# Patient Record
Sex: MALE | Employment: UNEMPLOYED | URBAN - METROPOLITAN AREA
[De-identification: names, ages, dates, MRNs, and addresses within clinical notes are randomized per-mention and may not be internally consistent; named-entity substitution may affect disease eponyms.]

---

## 2020-12-29 ENCOUNTER — OFFICE VISIT (OUTPATIENT)
Dept: FAMILY MEDICINE | Facility: CLINIC | Age: 60
End: 2020-12-29

## 2020-12-29 VITALS
DIASTOLIC BLOOD PRESSURE: 86 MMHG | BODY MASS INDEX: 40.11 KG/M2 | HEIGHT: 69 IN | SYSTOLIC BLOOD PRESSURE: 130 MMHG | OXYGEN SATURATION: 94 % | HEART RATE: 68 BPM | WEIGHT: 270.8 LBS | TEMPERATURE: 98.1 F

## 2020-12-29 DIAGNOSIS — Z76.89 HEALTH CARE HOME: ICD-10-CM

## 2020-12-29 DIAGNOSIS — Z71.89 ACP (ADVANCE CARE PLANNING): ICD-10-CM

## 2020-12-29 DIAGNOSIS — Z00.00 ROUTINE HISTORY AND PHYSICAL EXAMINATION OF ADULT: Primary | ICD-10-CM

## 2020-12-29 DIAGNOSIS — I10 ESSENTIAL HYPERTENSION: ICD-10-CM

## 2020-12-29 PROBLEM — E66.01 MORBID OBESITY (H): Status: ACTIVE | Noted: 2020-12-29

## 2020-12-29 LAB
ALBUMIN SERPL-MCNC: 4.3 G/DL (ref 3.6–5.1)
ALBUMIN/GLOB SERPL: 1.7 {RATIO} (ref 1–2.5)
ALP SERPL-CCNC: 43 U/L (ref 33–130)
ALT 1742-6: 49 U/L (ref 0–32)
AST 1920-8: 30 U/L (ref 0–35)
BILIRUB SERPL-MCNC: 0.9 MG/DL (ref 0.2–1.2)
BUN SERPL-MCNC: 19 MG/DL (ref 7–25)
BUN/CREATININE RATIO: 22.4 (ref 6–22)
CALCIUM SERPL-MCNC: 9.6 MG/DL (ref 8.6–10.3)
CHLORIDE SERPLBLD-SCNC: 101 MMOL/L (ref 98–110)
CHOLEST SERPL-MCNC: 209 MG/DL (ref 0–199)
CHOLEST/HDLC SERPL: 4 {RATIO} (ref 0–5)
CO2 SERPL-SCNC: 33.8 MMOL/L (ref 20–32)
CREAT SERPL-MCNC: 0.85 MG/DL (ref 0.7–1.18)
GLOBULIN, CALCULATED - QUEST: 2.5 (ref 1.9–3.7)
GLUCOSE SERPL-MCNC: 112 MG/DL (ref 60–99)
HDLC SERPL-MCNC: 52 MG/DL (ref 40–150)
HEMOGLOBIN: 16.7 G/DL (ref 13.3–17.7)
LDLC SERPL CALC-MCNC: 129 MG/DL (ref 0–130)
POTASSIUM SERPL-SCNC: 4.48 MMOL/L (ref 3.5–5.3)
PROT SERPL-MCNC: 6.8 G/DL (ref 6.1–8.1)
SODIUM SERPL-SCNC: 140.2 MMOL/L (ref 135–146)
TRIGL SERPL-MCNC: 142 MG/DL (ref 0–149)

## 2020-12-29 PROCEDURE — 36415 COLL VENOUS BLD VENIPUNCTURE: CPT | Performed by: FAMILY MEDICINE

## 2020-12-29 PROCEDURE — 85018 HEMOGLOBIN: CPT | Performed by: FAMILY MEDICINE

## 2020-12-29 PROCEDURE — 84153 ASSAY OF PSA TOTAL: CPT | Mod: 90 | Performed by: FAMILY MEDICINE

## 2020-12-29 PROCEDURE — 99396 PREV VISIT EST AGE 40-64: CPT | Performed by: FAMILY MEDICINE

## 2020-12-29 PROCEDURE — 80053 COMPREHEN METABOLIC PANEL: CPT | Performed by: FAMILY MEDICINE

## 2020-12-29 PROCEDURE — 80061 LIPID PANEL: CPT | Performed by: FAMILY MEDICINE

## 2020-12-29 RX ORDER — LISINOPRIL AND HYDROCHLOROTHIAZIDE 20; 25 MG/1; MG/1
TABLET ORAL
Qty: 90 TABLET | Refills: 1 | Status: SHIPPED | OUTPATIENT
Start: 2020-12-29 | End: 2020-12-29

## 2020-12-29 RX ORDER — LISINOPRIL AND HYDROCHLOROTHIAZIDE 20; 25 MG/1; MG/1
TABLET ORAL
COMMUNITY
Start: 2020-09-14 | End: 2020-12-29

## 2020-12-29 RX ORDER — LISINOPRIL AND HYDROCHLOROTHIAZIDE 20; 25 MG/1; MG/1
TABLET ORAL
Qty: 90 TABLET | Refills: 3 | Status: SHIPPED | OUTPATIENT
Start: 2020-12-29 | End: 2022-01-04

## 2020-12-29 ASSESSMENT — MIFFLIN-ST. JEOR: SCORE: 2028.72

## 2020-12-29 NOTE — PROGRESS NOTES
SUBJECTIVE:    CC: Mayur Celeste is a 60 year old male who presents for physical    HPI: over all doing well  Walks daily      HISTORIES:    PROBLEM LIST:                   Patient Active Problem List   Diagnosis     Status post THR (total hip replacement)     Osteoarthritis     ACP (advance care planning)     Health Care Home     Essential hypertension, benign     Family history of prostate cancer     Morbid obesity (H)       PAST MEDICAL HISTORY:                  Past Medical History:   Diagnosis Date     Chronic pain     back pain     Hypertension      PONV (postoperative nausea and vomiting)        PAST SURGICAL HISTORY:                  Past Surgical History:   Procedure Laterality Date     ARTHROPLASTY HIP      left     ARTHROPLASTY HIP  12/21/2011    Procedure:ARTHROPLASTY HIP; Right Total Hip Arthroplasty, Right Hip Hardware Removal     ; Surgeon:ISAAC RODRIGUEZ; Location:[facility]     ORTHOPEDIC SURGERY      right hip pinning       CURRENT MEDICATIONS:                  Current Outpatient Medications   Medication Sig Dispense Refill     lisinopril-hydrochlorothiazide (ZESTORETIC) 20-25 MG tablet TK 1 T PO D 90 tablet 1     naproxen sodium (ANAPROX) 220 MG tablet Take 220 mg by mouth 2 times daily (with meals).         ALLERGIES:                No Known Allergies    SOCIAL HISTORY:                  Social History     Socioeconomic History     Marital status:      Spouse name: Not on file     Number of children: Not on file     Years of education: Not on file     Highest education level: Not on file   Occupational History     Not on file   Social Needs     Financial resource strain: Not on file     Food insecurity     Worry: Not on file     Inability: Not on file     Transportation needs     Medical: Not on file     Non-medical: Not on file   Tobacco Use     Smoking status: Never Smoker     Smokeless tobacco: Never Used   Substance and Sexual Activity     Alcohol use: Yes     Comment: occ     Drug use:  "No     Sexual activity: Not on file   Lifestyle     Physical activity     Days per week: Not on file     Minutes per session: Not on file     Stress: Not on file   Relationships     Social connections     Talks on phone: Not on file     Gets together: Not on file     Attends Zoroastrianism service: Not on file     Active member of club or organization: Not on file     Attends meetings of clubs or organizations: Not on file     Relationship status: Not on file     Intimate partner violence     Fear of current or ex partner: Not on file     Emotionally abused: Not on file     Physically abused: Not on file     Forced sexual activity: Not on file   Other Topics Concern     Not on file   Social History Narrative     Not on file       FAMILY HISTORY:                 History reviewed. No pertinent family history.    HEALTH MAINTENANCE:                    REVIEW OF OUTSIDE RECORDS: NO    REVIEW OF SYSTEMS:  CONSTITUTIONAL: NEGATIVE for fever, chills  INTEGUMENTARY/SKIN: NEGATIVE for worrisome rashes, moles or lesions  EYES: NEGATIVE for vision changes   ENT/MOUTH: NEGATIVE for ear, mouth and throat problems  RESP: NEGATIVE for significant cough or SOB  CV: NEGATIVE for chest pain, palpitations   GI: NEGATIVE for nausea, abdominal pain, heartburn, or change in bowel habits  : NEGATIVE for frequency, dysuria, or hematuria  MUSCULOSKELETAL: NEGATIVE for significant arthralgias or myalgia  NEURO: NEGATIVE for weakness, dizziness or paresthesias or headache  ENDOCRINE: NEGATIVE for temperature intolerance, skin/hair changes  HEME: NEGATIVE for bleeding problems  PSYCHIATRIC: NEGATIVE for changes in mood or affect    EXAM:    /86 (BP Location: Right arm, Patient Position: Sitting, Cuff Size: Adult Large)   Pulse 68   Temp 98.1  F (36.7  C) (Oral)   Ht 1.753 m (5' 9\")   Wt 122.8 kg (270 lb 12.8 oz)   SpO2 94%   BMI 39.99 kg/m    GENERAL APPEARANCE: healthy, alert and no distress   EXAM:  GENERAL APPEARANCE: healthy, " alert and no distress  EYES: EOMI,  PERRL  HENT: ear canals and TM's normal and nose and mouth without ulcers or lesions  NECK: no adenopathy, no asymmetry, masses, or scars and thyroid normal to palpation  RESP: lungs clear to auscultation - no rales, rhonchi or wheezes  CV: regular rates and rhythm, normal S1 S2, no S3 or S4 and no murmur, click or rub -  ABDOMEN:  soft, nontender, no HSM or masses and bowel sounds normal  Rectal exam: prostate symmetric w/o nodularity, no masses palpated  GU_male: testicles normal without atrophy or masses and no hernias  MS: extremities normal- no gross deformities noted, no evidence of inflammation in joints, FROM in all extremities.  SKIN: no suspicious lesions or rashes  NEURO: Normal strength and tone, sensory exam grossly normal, mentation intact and speech normal  PSYCH: mentation appears normal and affect normal/bright  LYMPHATICS: No axillary, cervical, inguinal, or supraclavicular nodes         ASSESSMENT/PLAN  (Z00.00) Routine history and physical examination of adult  (primary encounter diagnosis)  Comment: over all doing well  Plan: Comprehensive Metobolic Panel (BFP),         HEMOGLOBIN, Lipid Panel (BFP), PSA Total         ([facility]), VENOUS COLLECTION            (Z71.89) ACP (advance care planning)  Comment:   Plan:     (Z76.89) Health Care Home  Comment:   Plan:     (I10) Essential hypertension  Comment: well controlled  Plan: lisinopril-hydrochlorothiazide (ZESTORETIC)         20-25 MG tablet, Comprehensive Metobolic Panel         (BFP)                I have discussed with patient the risks, benefits, medications, treatment options and modalities.   I have instructed the patient to call or schedule a follow-up appointment if any problems or failure to improve.

## 2020-12-29 NOTE — LETTER
December 30, 2020      Mayur Celeste  [address] [facility] [address]        Dear ,    We are writing to inform you of your test results.    Mayur here are your results like we discussed     Resulted Orders   Comprehensive Metobolic Panel (BFP)   Result Value Ref Range    Carbon Dioxide 33.8 (A) 20 - 32 mmol/L      Comment:      ran twice ea    Creatinine 0.85 0.70 - 1.18 mg/dL    Glucose 112 (A) 60 - 99 mg/dL    Sodium 140.2 135 - 146 mmol/L    Potassium 4.48 3.5 - 5.3 mmol/L    Chloride 101.0 98 - 110 mmol/L    Protein Total 6.8 6.1 - 8.1 g/dL    Albumin 4.3 3.6 - 5.1 g/dL    Alkaline Phosphatase 43 33 - 130 U/L    ALT 49 (A) 0 - 32 U/L    AST 30 0 - 35 U/L    Bilirubin Total 0.9 0.2 - 1.2 mg/dL    Urea Nitrogen 19 7 - 25 mg/dL    Calcium 9.6 8.6 - 10.3 mg/dL    BUN/Creatinine Ratio 22.4 (A) 6 - 22    Globulin Calculated 2.5 1.9 - 3.7    A/G Ratio 1.7 1 - 2.5   Lipid Panel (BFP)   Result Value Ref Range    Cholesterol 209 (A) 0 - 199 mg/dL    Triglycerides 142 0 - 149 mg/dL    HDL Cholesterol 52 40 - 150 mg/dL    LDL Cholesterol Direct 129 0 - 130 mg/dL    Cholesterol/HDL Ratio 4 0 - 5   PSA Total ([facility])   Result Value Ref Range    Abbott PSA 1.9 < OR = 4.0 ng/mL      Comment:      The total PSA value from this assay system is   standardized against the WHO standard. The test   result will be approximately 20% lower when compared   to the equimolar-standardized total PSA (Essie   Mal[place]). Comparison of serial PSA results should be   interpreted with this fact in mind.     This test was performed using the Siemens   chemiluminescent method. Values obtained from   different assay methods cannot be used  interchangeably. PSA levels, regardless of  value, should not be interpreted as absolute  evidence of the presence or absence of disease.     HEMOGLOBIN (BFP)   Result Value Ref Range    Hemoglobin 16.7 13.3 - 17.7 g/dL       If you have any questions or concerns, please call the clinic at the  number listed above.       Sincerely,      Med Fitzpatrick MD

## 2020-12-29 NOTE — LETTER
[facility]  [address] [place]  [address]  [facility] [address]  [phone]      December 29, 2020      Mayur Celeste  [address] [facility] [address]      EMERGENCY CARE PLAN  Presenting Problem Treatment Plan   Questions or concerns during clinic hours I will call the clinic directly:    [facility]  [address], [address]  [place] [address]  [phone]   Questions or concerns outside clinic hours  I will call the 24 hour line at [phone]   Patient needs to schedule an appointment  I will call the  scheduling line at [phone]   Same day treatment   I will call the clinic first, then  urgent care and/or  express care if needed   Clinic Care Coordinators Ina Garnett RN:  [phone]  [facility] Clinical Support Staff: [phone]    Crisis Services:  Behavioral or Mental Health P ([facility])   [phone]   Emergency treatment--Immediately CALL 231

## 2020-12-29 NOTE — NURSING NOTE
Mayur is here to establish care and fasting CPX.    Pre-Visit Screening:  Immunizations:UTD  Colonoscopy:Needs  Mammogram:NA  Asthma Action Test/Plan:NA  PHQ9:NA  GAD7:NA  Questioned patient about current smoking habits Pt.never smoked  OK to leave a detailed message on voice mail for today's visit yes, phone # [phone]

## 2020-12-30 LAB — ABBOTT PSA - QUEST: 1.9 NG/ML

## 2021-07-01 ENCOUNTER — TELEPHONE (OUTPATIENT)
Dept: FAMILY MEDICINE | Facility: CLINIC | Age: 61
End: 2021-07-01

## 2021-07-01 NOTE — TELEPHONE ENCOUNTER
Ok'd the remainder of Mayur's lisinopril-hydrochlorothiazide (qty 90, 1 refill) to be under Dr. Dumont's DILIA. As Dr. Fitzpatrick's DILIA is . Pt will be due back for a routine cpx in Dec 2021.

## 2021-08-18 ENCOUNTER — OFFICE VISIT (OUTPATIENT)
Dept: FAMILY MEDICINE | Facility: CLINIC | Age: 61
End: 2021-08-18

## 2021-08-18 DIAGNOSIS — H25.9 AGE-RELATED CATARACT OF BOTH EYES, UNSPECIFIED AGE-RELATED CATARACT TYPE: ICD-10-CM

## 2021-08-18 DIAGNOSIS — Z12.11 SCREENING FOR COLON CANCER: ICD-10-CM

## 2021-08-18 DIAGNOSIS — I10 ESSENTIAL HYPERTENSION: ICD-10-CM

## 2021-08-18 DIAGNOSIS — E66.01 CLASS 3 SEVERE OBESITY DUE TO EXCESS CALORIES WITH SERIOUS COMORBIDITY AND BODY MASS INDEX (BMI) OF 40.0 TO 44.9 IN ADULT (H): ICD-10-CM

## 2021-08-18 DIAGNOSIS — E66.813 CLASS 3 SEVERE OBESITY DUE TO EXCESS CALORIES WITH SERIOUS COMORBIDITY AND BODY MASS INDEX (BMI) OF 40.0 TO 44.9 IN ADULT (H): ICD-10-CM

## 2021-08-18 DIAGNOSIS — Z01.818 PREOPERATIVE EXAMINATION: Primary | ICD-10-CM

## 2021-08-18 PROCEDURE — 99214 OFFICE O/P EST MOD 30 MIN: CPT | Performed by: FAMILY MEDICINE

## 2021-08-18 ASSESSMENT — MIFFLIN-ST. JEOR: SCORE: 2064.99

## 2021-08-18 NOTE — LETTER
[facility]  [address]  [address]  [facility] [address]  Phone: [phone]  Fax: [phone]  Primary Provider: Rich Dumont        PREOPERATIVE EVALUATION:  Today's date: 8/18/2021    Mayur Celeste is a 61 year old male who presents for a preoperative evaluation.    Surgical Information:  Surgery/Procedure: cataract  Surgery Location: [facility]  Surgeon: Dr Lozoya  Surgery Date: 8/26/21, 9/13/21  Time of Surgery: am  Where patient plans to recover: At home with family  Fax number for surgical facility:     Type of Anesthesia Anticipated: to be determined    Assessment & Plan     The proposed surgical procedure is considered LOW risk.    Preoperative examination      Age-related cataract of both eyes, unspecified age-related cataract type      Essential hypertension  Elevated slightly today, has gained weight, Check blood pressure readings outside of the clinic several times per week, write down values, and follow up if elevated within the next several weeks. Blood pressure can be checked at the firestation, drugstore,  or any valid site.     Screening for colon cancer    - Adult Gastro Ref - Procedure Only    Class 3 severe obesity due to excess calories with serious comorbidity and body mass index (BMI) of 40.0 to 44.9 in adult (H)  Needs work, Continue to work on healthy diet and exercise, discussed healthy habits           Risks and Recommendations:  The patient has the following additional risks and recommendations for perioperative complications:   - Morbid obesity (BMI >40)    Medication Instructions:  Patient is to take all scheduled medications on the day of surgery    RECOMMENDATION:  APPROVAL GIVEN to proceed with proposed procedure, without further diagnostic evaluation.    Review of external notes as documented above   Review of the result(s) of each unique test - previous labs                  Subjective     1. No - Have you ever had a heart  attack or stroke?  2. No - Have you ever had surgery on your heart or blood vessels, such as a stent, coronary (heart) bypass, or surgery on an artery in the head, neck, heart, or legs?  3. No - Do you have chest pain when you are physically active?  4. No - Do you have a history of heart failure?  5. No - Do you currently have a cold, bronchitis, or symptoms of other respiratory (head and chest) infections?  6. No - Do you have a cough, shortness of breath, or wheezing?  7. No - Do you or anyone in your family have a history of blood clots?  8. No - Do you or anyone in your family have a serious bleeding problem, such as long-lasting bleeding after surgeries or cuts?  9. No - Have you ever had anemia or been told to take iron pills?  10. No - Have you had any abnormal blood loss such as black, tarry or bloody stools, or abnormal vaginal bleeding?  11. No - Have you ever had a blood transfusion?  12. Yes - Are you willing to have a blood transfusion if it is medically needed before, during, or after your surgery?  13. No - Have you or anyone in your family ever had problems with anesthesia (sedation for surgery)?  14. No - Do you have sleep apnea, excessive snoring, or daytime drowsiness?   15. No - Do you have any artifical heart valves or other implanted medical devices, such as a pacemaker, defibrillator, or continuous glucose monitor?  16. No - Do you have any artifical joints?  17. No - Are you allergic to latex?  18. No - Is there any chance that you may be pregnant?      Health Care Directive:  Patient does not have a Health Care Directive or Living Will: Discussed advance care planning with patient; information given to patient to review.      Status of Chronic Conditions:  HYPERTENSION - Patient has longstanding history of HTN , currently denies any symptoms referable to elevated blood pressure. Specifically denies chest pain, palpitations, dyspnea, orthopnea, PND or peripheral edema. Blood pressure  "readings have generally been in normal range. Current medication regimen is as listed below. Patient denies any side effects of medication.       Review of Systems  CONSTITUTIONAL: NEGATIVE for fever, chills, change in weight  ENT/MOUTH: NEGATIVE for ear, mouth and throat problems  RESP: NEGATIVE for significant cough or SOB  CV: NEGATIVE for chest pain, palpitations or peripheral edema    Patient Active Problem List    Diagnosis Date Noted     ACP (advance care planning) 12/29/2020     Priority: Medium     Health Care Home 12/29/2020     Priority: Medium     Morbid obesity (H) 12/29/2020     Priority: Medium     Status post THR (total hip replacement) 12/21/2011     Priority: Medium     Osteoarthritis 12/21/2011     Priority: Medium     Essential hypertension, benign 12/07/2010     Priority: Medium     Family history of prostate cancer 12/07/2010     Priority: Medium      Past Medical History:   Diagnosis Date     Chronic pain     back pain     Hypertension      PONV (postoperative nausea and vomiting)      Past Surgical History:   Procedure Laterality Date     ARTHROPLASTY HIP      left     ARTHROPLASTY HIP  12/21/2011    Procedure:ARTHROPLASTY HIP; Right Total Hip Arthroplasty, Right Hip Hardware Removal     ; Surgeon:ISAAC RODRIGUEZ; Location:[facility]     ORTHOPEDIC SURGERY      right hip pinning     Current Outpatient Medications   Medication Sig Dispense Refill     lisinopril-hydrochlorothiazide (ZESTORETIC) 20-25 MG tablet TK 1 T PO D 90 tablet 3     naproxen sodium (ANAPROX) 220 MG tablet Take 220 mg by mouth 2 times daily (with meals).         No Known Allergies     Social History     Tobacco Use     Smoking status: Never Smoker     Smokeless tobacco: Never Used   Substance Use Topics     Alcohol use: Yes     Comment: occ     No family history on file.  History   Drug Use No         Objective     Resp 20   Ht 1.753 m (5' 9\")   Wt 125.4 kg (276 lb 6.4 oz)   BMI 40.82 kg/m      Physical Exam  GENERAL " APPEARANCE: healthy, alert and no distress  HENT: ear canals and TM's normal and nose and mouth without ulcers or lesions  RESP: lungs clear to auscultation - no rales, rhonchi or wheezes  CV: regular rate and rhythm, normal S1 S2, no S3 or S4 and no murmur, click or rub   ABDOMEN: soft, nontender, no HSM or masses and bowel sounds normal  NEURO: Normal strength and tone, sensory exam grossly normal, mentation intact and speech normal    Recent Labs   Lab Test 12/29/20  1210 12/29/20  0000   HGB  --  16.7   .2  --    POTASSIUM 4.48  --    CR 0.85  --         Diagnostics:  No labs were ordered during this visit.   No EKG required for low risk surgery (cataract, skin procedure, breast biopsy, etc).    Revised Cardiac Risk Index (RCRI):  The patient has the following serious cardiovascular risks for perioperative complications:   - No serious cardiac risks = 0 points     RCRI Interpretation: 0 points: Class I (very low risk - 0.4% complication rate)           Signed Electronically by: Rich Dumont MD  Copy of this evaluation report is provided to requesting physician.

## 2021-08-18 NOTE — NURSING NOTE
Mayur Celeste is here for a pre-op for cataracts    Questioned patient about current smoking habits.  Pt. has never smoked.  PULSE regular  My Chart: active  CLASSIFICATION OF OVERWEIGHT AND OBESITY BY BMI                        Obesity Class           BMI(kg/m2)  Underweight                                    < 18.5  Normal                                         18.5-24.9  Overweight                                     25.0-29.9  OBESITY                     I                  30.0-34.9                             II                 35.0-39.9  EXTREME OBESITY             III                >40                            Patient's  BMI Body mass index is 40.82 kg/m .  http://hin.nhlbi.nih.gov/menuplanner/menu.cgi  Pre-visit planning  Immunizations - up to date  Colonoscopy - is up to date  Mammogram -   Asthma -   PHQ9 -    BETITO-7 -    Hearing Test -      no

## 2021-08-18 NOTE — PROGRESS NOTES
[facility]  [address]  [address]  [facility] [address]  Phone: [phone]  Fax: [phone]  Primary Provider: Rich Dumont        PREOPERATIVE EVALUATION:  Today's date: 8/18/2021    Mayur Celeste is a 61 year old male who presents for a preoperative evaluation.    Surgical Information:  Surgery/Procedure: cataract  Surgery Location: [facility]  Surgeon: Dr Lozoya  Surgery Date: 8/26/21, 9/13/21  Time of Surgery: am  Where patient plans to recover: At home with family  Fax number for surgical facility:     Type of Anesthesia Anticipated: to be determined    Assessment & Plan     The proposed surgical procedure is considered LOW risk.    Preoperative examination      Age-related cataract of both eyes, unspecified age-related cataract type      Essential hypertension  Elevated slightly today, has gained weight, Check blood pressure readings outside of the clinic several times per week, write down values, and follow up if elevated within the next several weeks. Blood pressure can be checked at the firestation, drugstore,  or any valid site.     Screening for colon cancer    - Adult Gastro Ref - Procedure Only    Class 3 severe obesity due to excess calories with serious comorbidity and body mass index (BMI) of 40.0 to 44.9 in adult (H)  Needs work, Continue to work on healthy diet and exercise, discussed healthy habits           Risks and Recommendations:  The patient has the following additional risks and recommendations for perioperative complications:   - Morbid obesity (BMI >40)    Medication Instructions:  Patient is to take all scheduled medications on the day of surgery    RECOMMENDATION:  APPROVAL GIVEN to proceed with proposed procedure, without further diagnostic evaluation.    Review of external notes as documented above   Review of the result(s) of each unique test - previous labs                  Subjective     1. No - Have you ever had a heart  attack or stroke?  2. No - Have you ever had surgery on your heart or blood vessels, such as a stent, coronary (heart) bypass, or surgery on an artery in the head, neck, heart, or legs?  3. No - Do you have chest pain when you are physically active?  4. No - Do you have a history of heart failure?  5. No - Do you currently have a cold, bronchitis, or symptoms of other respiratory (head and chest) infections?  6. No - Do you have a cough, shortness of breath, or wheezing?  7. No - Do you or anyone in your family have a history of blood clots?  8. No - Do you or anyone in your family have a serious bleeding problem, such as long-lasting bleeding after surgeries or cuts?  9. No - Have you ever had anemia or been told to take iron pills?  10. No - Have you had any abnormal blood loss such as black, tarry or bloody stools, or abnormal vaginal bleeding?  11. No - Have you ever had a blood transfusion?  12. Yes - Are you willing to have a blood transfusion if it is medically needed before, during, or after your surgery?  13. No - Have you or anyone in your family ever had problems with anesthesia (sedation for surgery)?  14. No - Do you have sleep apnea, excessive snoring, or daytime drowsiness?   15. No - Do you have any artifical heart valves or other implanted medical devices, such as a pacemaker, defibrillator, or continuous glucose monitor?  16. No - Do you have any artifical joints?  17. No - Are you allergic to latex?  18. No - Is there any chance that you may be pregnant?      Health Care Directive:  Patient does not have a Health Care Directive or Living Will: Discussed advance care planning with patient; information given to patient to review.      Status of Chronic Conditions:  HYPERTENSION - Patient has longstanding history of HTN , currently denies any symptoms referable to elevated blood pressure. Specifically denies chest pain, palpitations, dyspnea, orthopnea, PND or peripheral edema. Blood pressure  "readings have generally been in normal range. Current medication regimen is as listed below. Patient denies any side effects of medication.       Review of Systems  CONSTITUTIONAL: NEGATIVE for fever, chills, change in weight  ENT/MOUTH: NEGATIVE for ear, mouth and throat problems  RESP: NEGATIVE for significant cough or SOB  CV: NEGATIVE for chest pain, palpitations or peripheral edema    Patient Active Problem List    Diagnosis Date Noted     ACP (advance care planning) 12/29/2020     Priority: Medium     Health Care Home 12/29/2020     Priority: Medium     Morbid obesity (H) 12/29/2020     Priority: Medium     Status post THR (total hip replacement) 12/21/2011     Priority: Medium     Osteoarthritis 12/21/2011     Priority: Medium     Essential hypertension, benign 12/07/2010     Priority: Medium     Family history of prostate cancer 12/07/2010     Priority: Medium      Past Medical History:   Diagnosis Date     Chronic pain     back pain     Hypertension      PONV (postoperative nausea and vomiting)      Past Surgical History:   Procedure Laterality Date     ARTHROPLASTY HIP      left     ARTHROPLASTY HIP  12/21/2011    Procedure:ARTHROPLASTY HIP; Right Total Hip Arthroplasty, Right Hip Hardware Removal     ; Surgeon:ISAAC RODRIGUEZ; Location:[facility] OR     ORTHOPEDIC SURGERY      right hip pinning     Current Outpatient Medications   Medication Sig Dispense Refill     lisinopril-hydrochlorothiazide (ZESTORETIC) 20-25 MG tablet TK 1 T PO D 90 tablet 3     naproxen sodium (ANAPROX) 220 MG tablet Take 220 mg by mouth 2 times daily (with meals).         No Known Allergies     Social History     Tobacco Use     Smoking status: Never Smoker     Smokeless tobacco: Never Used   Substance Use Topics     Alcohol use: Yes     Comment: occ     No family history on file.  History   Drug Use No         Objective     Resp 20   Ht 1.753 m (5' 9\")   Wt 125.4 kg (276 lb 6.4 oz)   BMI 40.82 kg/m      Physical Exam  GENERAL " APPEARANCE: healthy, alert and no distress  HENT: ear canals and TM's normal and nose and mouth without ulcers or lesions  RESP: lungs clear to auscultation - no rales, rhonchi or wheezes  CV: regular rate and rhythm, normal S1 S2, no S3 or S4 and no murmur, click or rub   ABDOMEN: soft, nontender, no HSM or masses and bowel sounds normal  NEURO: Normal strength and tone, sensory exam grossly normal, mentation intact and speech normal    Recent Labs   Lab Test 12/29/20  1210 12/29/20  0000   HGB  --  16.7   .2  --    POTASSIUM 4.48  --    CR 0.85  --         Diagnostics:  No labs were ordered during this visit.   No EKG required for low risk surgery (cataract, skin procedure, breast biopsy, etc).    Revised Cardiac Risk Index (RCRI):  The patient has the following serious cardiovascular risks for perioperative complications:   - No serious cardiac risks = 0 points     RCRI Interpretation: 0 points: Class I (very low risk - 0.4% complication rate)           Signed Electronically by: Rich Dumont MD  Copy of this evaluation report is provided to requesting physician.

## 2021-09-25 ENCOUNTER — HEALTH MAINTENANCE LETTER (OUTPATIENT)
Age: 61
End: 2021-09-25

## 2021-12-31 DIAGNOSIS — I10 ESSENTIAL HYPERTENSION: ICD-10-CM

## 2022-01-04 RX ORDER — LISINOPRIL AND HYDROCHLOROTHIAZIDE 20; 25 MG/1; MG/1
TABLET ORAL
Qty: 90 TABLET | Refills: 0 | COMMUNITY
Start: 2022-01-04

## 2022-01-04 RX ORDER — LISINOPRIL AND HYDROCHLOROTHIAZIDE 20; 25 MG/1; MG/1
TABLET ORAL
Qty: 30 TABLET | Refills: 0 | COMMUNITY
Start: 2022-01-04 | End: 2022-02-02

## 2022-01-04 NOTE — TELEPHONE ENCOUNTER
Pt scheduled an appt for 2/2/22. Called in a 30 day supply of lisinopril-hydrochlorothiazide.      Signed Prescriptions:                        Disp   Refills    lisinopril-hydrochlorothiazide (ZESTORETIC*30 tab*0        Sig: TK 1 T PO D  Authorizing Provider: TANYA BOYLE  Ordering User: FERDINAND FERNANDO    Refused Prescriptions:                       Disp   Refills    lisinopril-hydrochlorothiazide (ZESTORETIC*90 tab*0        Sig: Take 1 tablet by mouth once daily  Refused By: FERDINAND FERNANDO  Reason for Refusal: Patient needs appointment

## 2022-01-04 NOTE — TELEPHONE ENCOUNTER
Denied refill request for lisinopril-hydrochlorothiazide. Pt is due for annual med check. He was seen in Aug 2021 for a pre-op but needs an annual office visit for medications. He does not have to fast for this med but can come fasting.      Refused Prescriptions:                       Disp   Refills    lisinopril-hydrochlorothiazide (ZESTORETIC*90 tab*0        Sig: Take 1 tablet by mouth once daily  Refused By: FERDINAND FERNANDO  Reason for Refusal: Patient needs appointment

## 2022-02-02 ENCOUNTER — OFFICE VISIT (OUTPATIENT)
Dept: FAMILY MEDICINE | Facility: CLINIC | Age: 62
End: 2022-02-02

## 2022-02-02 VITALS
RESPIRATION RATE: 20 BRPM | DIASTOLIC BLOOD PRESSURE: 92 MMHG | BODY MASS INDEX: 39.57 KG/M2 | TEMPERATURE: 98.1 F | HEIGHT: 70 IN | WEIGHT: 276.4 LBS | SYSTOLIC BLOOD PRESSURE: 148 MMHG | HEART RATE: 72 BPM

## 2022-02-02 VITALS
HEART RATE: 72 BPM | SYSTOLIC BLOOD PRESSURE: 148 MMHG | TEMPERATURE: 97.8 F | HEIGHT: 70 IN | RESPIRATION RATE: 20 BRPM | WEIGHT: 274.9 LBS | BODY MASS INDEX: 39.35 KG/M2 | DIASTOLIC BLOOD PRESSURE: 98 MMHG

## 2022-02-02 DIAGNOSIS — Z80.42 FH: MALIGNANT NEOPLASM OF PROSTATE: ICD-10-CM

## 2022-02-02 DIAGNOSIS — Z00.00 ROUTINE HISTORY AND PHYSICAL EXAMINATION OF ADULT: Primary | ICD-10-CM

## 2022-02-02 DIAGNOSIS — R22.40 NODULE OF SKIN OF FOOT: ICD-10-CM

## 2022-02-02 DIAGNOSIS — I10 ESSENTIAL HYPERTENSION: ICD-10-CM

## 2022-02-02 DIAGNOSIS — E66.01 CLASS 2 SEVERE OBESITY DUE TO EXCESS CALORIES WITH SERIOUS COMORBIDITY AND BODY MASS INDEX (BMI) OF 39.0 TO 39.9 IN ADULT (H): ICD-10-CM

## 2022-02-02 DIAGNOSIS — N52.9 ERECTILE DYSFUNCTION, UNSPECIFIED ERECTILE DYSFUNCTION TYPE: ICD-10-CM

## 2022-02-02 DIAGNOSIS — Z11.59 SCREENING FOR VIRAL DISEASE: ICD-10-CM

## 2022-02-02 DIAGNOSIS — E66.812 CLASS 2 SEVERE OBESITY DUE TO EXCESS CALORIES WITH SERIOUS COMORBIDITY AND BODY MASS INDEX (BMI) OF 39.0 TO 39.9 IN ADULT (H): ICD-10-CM

## 2022-02-02 LAB
ALBUMIN SERPL-MCNC: 4.5 G/DL (ref 3.6–5.1)
ALBUMIN/GLOB SERPL: 1.6 {RATIO} (ref 1–2.5)
ALP SERPL-CCNC: 44 U/L (ref 33–130)
ALT 1742-6: 75 U/L (ref 0–32)
AST 1920-8: 42 U/L (ref 0–35)
BILIRUB SERPL-MCNC: 0.7 MG/DL (ref 0.2–1.2)
BUN SERPL-MCNC: 18 MG/DL (ref 7–25)
BUN/CREATININE RATIO: 20 (ref 6–22)
CALCIUM SERPL-MCNC: 9.7 MG/DL (ref 8.6–10.3)
CHLORIDE SERPLBLD-SCNC: 101.4 MMOL/L (ref 98–110)
CHOLEST SERPL-MCNC: 187 MG/DL (ref 0–199)
CHOLEST/HDLC SERPL: 4 {RATIO} (ref 0–5)
CO2 SERPL-SCNC: 30.5 MMOL/L (ref 20–32)
CREAT SERPL-MCNC: 0.9 MG/DL (ref 0.6–1.3)
GLOBULIN, CALCULATED - QUEST: 2.9 (ref 1.9–3.7)
GLUCOSE SERPL-MCNC: 117 MG/DL (ref 60–99)
HDLC SERPL-MCNC: 47 MG/DL (ref 40–150)
LDLC SERPL CALC-MCNC: 112 MG/DL (ref 0–130)
POTASSIUM SERPL-SCNC: 4.57 MMOL/L (ref 3.5–5.3)
PROT SERPL-MCNC: 7.4 G/DL (ref 6.1–8.1)
SODIUM SERPL-SCNC: 140.5 MMOL/L (ref 135–146)
TRIGL SERPL-MCNC: 139 MG/DL (ref 0–149)

## 2022-02-02 PROCEDURE — 84153 ASSAY OF PSA TOTAL: CPT | Mod: 90 | Performed by: FAMILY MEDICINE

## 2022-02-02 PROCEDURE — 80061 LIPID PANEL: CPT | Performed by: FAMILY MEDICINE

## 2022-02-02 PROCEDURE — 99396 PREV VISIT EST AGE 40-64: CPT | Performed by: FAMILY MEDICINE

## 2022-02-02 PROCEDURE — 86803 HEPATITIS C AB TEST: CPT | Mod: 90 | Performed by: FAMILY MEDICINE

## 2022-02-02 PROCEDURE — 80053 COMPREHEN METABOLIC PANEL: CPT | Performed by: FAMILY MEDICINE

## 2022-02-02 PROCEDURE — 99213 OFFICE O/P EST LOW 20 MIN: CPT | Mod: 25 | Performed by: FAMILY MEDICINE

## 2022-02-02 PROCEDURE — 36415 COLL VENOUS BLD VENIPUNCTURE: CPT | Performed by: FAMILY MEDICINE

## 2022-02-02 RX ORDER — AMLODIPINE BESYLATE 5 MG/1
5 TABLET ORAL DAILY
Qty: 90 TABLET | Refills: 0 | Status: SHIPPED | OUTPATIENT
Start: 2022-02-02 | End: 2022-05-09

## 2022-02-02 RX ORDER — LISINOPRIL AND HYDROCHLOROTHIAZIDE 20; 25 MG/1; MG/1
1 TABLET ORAL DAILY
Qty: 90 TABLET | Refills: 3 | Status: SHIPPED | OUTPATIENT
Start: 2022-02-02 | End: 2022-12-13

## 2022-02-02 RX ORDER — SILDENAFIL 100 MG/1
100 TABLET, FILM COATED ORAL DAILY PRN
Qty: 30 TABLET | Refills: 1 | Status: SHIPPED | OUTPATIENT
Start: 2022-02-02 | End: 2024-08-06

## 2022-02-02 ASSESSMENT — MIFFLIN-ST. JEOR: SCORE: 2058.19

## 2022-02-02 NOTE — NURSING NOTE
Mayur Celeste is here for a CPX.    Pre-visit planning  Immunizations -up to date  Colonoscopy -Wife is making an appointment  Mammogram -  Asthma test --  PHQ9 -  BETITO 7 -  Hearing screen -    Questioned patient about current smoking habits.  Pt. has never smoked.  Body mass index is 39.44 kg/m .  PULSE regular  My Chart: active  CLASSIFICATION OF OVERWEIGHT AND OBESITY BY BMI                        Obesity Class           BMI(kg/m2)  Underweight                                    < 18.5  Normal                                         18.5-24.9  Overweight                                     25.0-29.9  OBESITY                     I                  30.0-34.9                             II                 35.0-39.9  EXTREME OBESITY             III                >40                            Patient's  BMI Body mass index is 39.44 kg/m .

## 2022-02-02 NOTE — PROGRESS NOTES
3  SUBJECTIVE:   CC: Mayur Celeste is an 61 year old male who presents for preventive health visit.       Patient has been advised of split billing requirements and indicates understanding: Yes  Healthy Habits:    Do you get at least three servings of calcium containing foods daily (dairy, green leafy vegetables, etc.)? yes    Amount of exercise or daily activities, outside of work: most day(s) per week    Problems taking medications regularly No    Medication side effects: No    Have you had an eye exam in the past two years? yes    Do you see a dentist twice per year? yes    Do you have sleep apnea, excessive snoring or daytime drowsiness?no          Today's PHQ-2 Score:   PHQ-2 ( 1999 [employer]) 2/2/2022 12/29/2020   Q1: Little interest or pleasure in doing things 0 0   Q2: Feeling down, depressed or hopeless 0 0   PHQ-2 Score 0 0   PHQ-2 Total Score (12-17 Years)- Positive if 3 or more points; Administer PHQ-A if positive - 0       Abuse: Current or Past(Physical, Sexual or Emotional)- No  Do you feel safe in your environment? Yes        Social History     Tobacco Use     Smoking status: Never Smoker     Smokeless tobacco: Never Used   Substance Use Topics     Alcohol use: Yes     Comment: occ     If you drink alcohol do you typically have >3 drinks per day or >7 drinks per week? No                      Last PSA:   [facility] PSA   Date Value Ref Range Status   12/29/2020 1.9 < OR = 4.0 ng/mL Final     Comment:     The total PSA value from this assay system is   standardized against the WHO standard. The test   result will be approximately 20% lower when compared   to the equimolar-standardized total PSA (Essie   Malia). Comparison of serial PSA results should be   interpreted with this fact in mind.     This test was performed using the Siemens   chemiluminescent method. Values obtained from   different assay methods cannot be used  interchangeably. PSA levels, regardless of  value, should not be interpreted  as absolute  evidence of the presence or absence of disease.         Reviewed orders with patient. Reviewed health maintenance and updated orders accordingly - Yes  BP Readings from Last 3 Encounters:   02/02/22 (!) 148/98   08/18/21 (!) 148/92   12/29/20 130/86    Wt Readings from Last 3 Encounters:   02/02/22 124.7 kg (274 lb 14.4 oz)   08/18/21 125.4 kg (276 lb 6.4 oz)   12/29/20 122.8 kg (270 lb 12.8 oz)                  Patient Active Problem List   Diagnosis     Status post THR (total hip replacement)     Osteoarthritis     ACP (advance care planning)     Health Care Home     Essential hypertension, benign     Family history of prostate cancer     Morbid obesity (H)     Past Surgical History:   Procedure Laterality Date     ARTHROPLASTY HIP      left     ARTHROPLASTY HIP  12/21/2011    Procedure:ARTHROPLASTY HIP; Right Total Hip Arthroplasty, Right Hip Hardware Removal     ; Surgeon:ISAAC RODRIGUEZ; Location:[facility] OR     ORTHOPEDIC SURGERY      right hip pinning       Social History     Tobacco Use     Smoking status: Never Smoker     Smokeless tobacco: Never Used   Substance Use Topics     Alcohol use: Yes     Comment: occ     Family History   Problem Relation Age of Onset     Prostate Cancer Father      Coronary Artery Disease Father      Colon Cancer Other      Cerebrovascular Disease Other          Current Outpatient Medications   Medication Sig Dispense Refill     amLODIPine (NORVASC) 5 MG tablet Take 1 tablet (5 mg) by mouth daily 90 tablet 0     lisinopril-hydrochlorothiazide (ZESTORETIC) 20-25 MG tablet Take 1 tablet by mouth daily TK 1 T PO D 90 tablet 3     naproxen sodium (ANAPROX) 220 MG tablet Take 220 mg by mouth 2 times daily (with meals).       No Known Allergies  Recent Labs   Lab Test 12/29/20  1210 12/29/20  1209   LDL  --  129   HDL  --  52   TRIG  --  142   CR 0.85  --    POTASSIUM 4.48  --         Reviewed and updated as needed this visit by clinical staff  Tobacco               Reviewed  "and updated as needed this visit by Provider               Past Medical History:   Diagnosis Date     Chronic pain     back pain     Hypertension      PONV (postoperative nausea and vomiting)       Past Surgical History:   Procedure Laterality Date     ARTHROPLASTY HIP      left     ARTHROPLASTY HIP  12/21/2011    Procedure:ARTHROPLASTY HIP; Right Total Hip Arthroplasty, Right Hip Hardware Removal     ; Surgeon:ISAAC RODRIGUEZ; Location:[facility] OR     ORTHOPEDIC SURGERY      right hip pinning       ROS:  CONSTITUTIONAL: NEGATIVE for fever, chills, change in weight  INTEGUMENTARY/SKIN: NEGATIVE for worrisome rashes, moles or lesions  EYES: NEGATIVE for vision changes or irritation  ENT: NEGATIVE for ear, mouth and throat problems  RESP: NEGATIVE for significant cough or SOB  CV: NEGATIVE for chest pain, palpitations or peripheral edema  GI: NEGATIVE for nausea, abdominal pain, heartburn, or change in bowel habits   male: negative for dysuria, hematuria, decreased urinary stream, erectile dysfunction, urethral discharge  MUSCULOSKELETAL: NEGATIVE for significant arthralgias or myalgia  NEURO: NEGATIVE for weakness, dizziness or paresthesias  ENDOCRINE: NEGATIVE for temperature intolerance, skin/hair changes  HEME/ALLERGY/IMMUNE: NEGATIVE for bleeding problems  PSYCHIATRIC: NEGATIVE for changes in mood or affect    OBJECTIVE:   BP (!) 148/98 (BP Location: Left arm, Patient Position: Chair, Cuff Size: Adult Large)   Pulse 72   Temp 97.8  F (36.6  C)   Resp 20   Ht 1.778 m (5' 10\")   Wt 124.7 kg (274 lb 14.4 oz)   BMI 39.44 kg/m    EXAM:  GENERAL: healthy, alert and no distress  EYES: Eyes grossly normal to inspection, PERRL and conjunctivae and sclerae normal  HENT: ear canals and TM's normal, nose and mouth without ulcers or lesions  NECK: no adenopathy, no asymmetry, masses, or scars and thyroid normal to palpation  RESP: lungs clear to auscultation - no rales, rhonchi or wheezes  CV: regular rate and rhythm, " normal S1 S2, no S3 or S4, no murmur, click or rub, no peripheral edema and peripheral pulses strong  ABDOMEN: soft, nontender, no hepatosplenomegaly, no masses and bowel sounds normal   (male): normal male genitalia without lesions or urethral discharge, no hernia  RECTAL (male): deferred  MS: no gross musculoskeletal defects noted, no edema  SKIN: no suspicious lesions or rashes  NEURO: Normal strength and tone, mentation intact and speech normal  PSYCH: mentation appears normal, affect normal/bright    Diagnostic Test Results:  Labs reviewed in Epic    ASSESSMENT/PLAN:     (Z00.00) Routine history and physical examination of adult  (primary encounter diagnosis)  Comment: discussed preventitive healthcare   Plan: Continue to work on healthy diet and exercise, discussed healthy habits     (I10) Essential hypertension  Comment: suboptimal control here and elsewhere, discussed options, we agree to add amlodipine, I reviewed the risks, benefits, and possible side effects of the medication.  The patient had an opportunity to ask any questions regarding the treatment plan. The patient was encouraged to call my office if any problems. Check blood pressure readings outside of the clinic several times per week, write down values, and follow up if elevated within the next several weeks. Blood pressure can be checked at the firestation, drugstore,  or any valid site.   Plan: lisinopril-hydrochlorothiazide (ZESTORETIC)         20-25 MG tablet, Comprehensive Metobolic Panel         ([facility]), VENOUS COLLECTION, Lipid Panel ([facility]),         amLODIPine (NORVASC) 5 MG tablet        F/u 3 mo for recheck and labs    (Z80.42) FH: malignant neoplasm of prostate  Comment: check yearly  Plan: PSA Total ([facility])            (Z11.59) Screening for viral disease  Comment:   Plan: HEPATITIS C ANTIBODY ([facility])            (E66.01,  Z68.39) Class 2 severe obesity due to excess calories with serious comorbidity and body mass index (BMI) of 39.0 to  "39.9 in adult (H)  Comment: pt now doing Mediterranean Diet  Plan: Continue to work on healthy diet and exercise, discussed healthy habits     (N52.9) Erectile dysfunction, unspecified erectile dysfunction type  Comment: new issue, discussed options, Discussed the multifactorial nature of erectile difficulties. No evidence of clear etiology. Discussed treatment options and risks/side effects . He elects to try Viagra. He agrees to never take nitrates and is aware of possible priapism .   Plan: sildenafil (VIAGRA) 100 MG tablet        I reviewed the risks, benefits, and possible side effects of the medication.  The patient had an opportunity to ask any questions regarding the treatment plan. The patient was encouraged to call my office if any problems.     (R22.40) Nodule of skin of foot  Comment: appears to be traumatic cyst -present years, no change  Plan: observe , if changes or symptoms refer to derm       Patient has been advised of split billing requirements and indicates understanding: Yes  COUNSELING:  Reviewed preventive health counseling, as reflected in patient instructions       Regular exercise       Healthy diet/nutrition       Vision screening       Hearing screening       Colorectal cancer screening       Prostate cancer screening    Estimated body mass index is 39.44 kg/m  as calculated from the following:    Height as of this encounter: 1.778 m (5' 10\").    Weight as of this encounter: 124.7 kg (274 lb 14.4 oz).    Weight management plan: Discussed healthy diet and exercise guidelines    He reports that he has never smoked. He has never used smokeless tobacco.      Counseling Resources:  ATP IV Guidelines  Pooled Cohorts Equation Calculator  FRAX Risk Assessment  ICSI Preventive Guidelines  Dietary Guidelines for Americans, 2010  USDA's MyPlate  ASA Prophylaxis  Lung CA Screening    Rich Dumont MD  [facility]  "

## 2022-02-03 LAB
ABBOTT PSA - QUEST: 2.26 NG/ML
HCV AB - QUEST: NORMAL
SIGNAL TO CUT OFF - QUEST: 0.01

## 2022-05-09 ENCOUNTER — TELEPHONE (OUTPATIENT)
Dept: FAMILY MEDICINE | Facility: CLINIC | Age: 62
End: 2022-05-09

## 2022-05-09 DIAGNOSIS — I10 ESSENTIAL HYPERTENSION: ICD-10-CM

## 2022-05-09 RX ORDER — AMLODIPINE BESYLATE 5 MG/1
5 TABLET ORAL DAILY
Qty: 30 TABLET | Refills: 0 | COMMUNITY
Start: 2022-05-09 | End: 2022-06-08

## 2022-05-09 NOTE — TELEPHONE ENCOUNTER
Called in 30 day to [facility]  Pt has OV on 5/23/22    Mayur Celeste is requesting a refill of:    Signed Prescriptions:                        Disp   Refills    amLODIPine (NORVASC) 5 MG tablet           30 tab*0        Sig: Take 1 tablet (5 mg) by mouth daily  Authorizing Provider: TANYA BOYLE  Ordering User: TAWANDA PATINO

## 2022-06-08 ENCOUNTER — OFFICE VISIT (OUTPATIENT)
Dept: FAMILY MEDICINE | Facility: CLINIC | Age: 62
End: 2022-06-08

## 2022-06-08 VITALS
HEIGHT: 70 IN | DIASTOLIC BLOOD PRESSURE: 94 MMHG | WEIGHT: 276.2 LBS | BODY MASS INDEX: 39.54 KG/M2 | RESPIRATION RATE: 20 BRPM | SYSTOLIC BLOOD PRESSURE: 138 MMHG

## 2022-06-08 DIAGNOSIS — E66.812 CLASS 2 SEVERE OBESITY DUE TO EXCESS CALORIES WITH SERIOUS COMORBIDITY AND BODY MASS INDEX (BMI) OF 39.0 TO 39.9 IN ADULT (H): ICD-10-CM

## 2022-06-08 DIAGNOSIS — R06.83 SNORING: ICD-10-CM

## 2022-06-08 DIAGNOSIS — I10 ESSENTIAL HYPERTENSION: Primary | ICD-10-CM

## 2022-06-08 DIAGNOSIS — E66.01 CLASS 2 SEVERE OBESITY DUE TO EXCESS CALORIES WITH SERIOUS COMORBIDITY AND BODY MASS INDEX (BMI) OF 39.0 TO 39.9 IN ADULT (H): ICD-10-CM

## 2022-06-08 DIAGNOSIS — N52.9 ERECTILE DYSFUNCTION, UNSPECIFIED ERECTILE DYSFUNCTION TYPE: ICD-10-CM

## 2022-06-08 PROCEDURE — 99214 OFFICE O/P EST MOD 30 MIN: CPT | Performed by: FAMILY MEDICINE

## 2022-06-08 RX ORDER — AMLODIPINE BESYLATE 5 MG/1
5 TABLET ORAL DAILY
Qty: 90 TABLET | Refills: 1 | Status: SHIPPED | OUTPATIENT
Start: 2022-06-08 | End: 2022-12-13

## 2022-06-08 NOTE — PROGRESS NOTES
Assessment & Plan     Essential hypertension  Control improved but still borderline suboptimal, discussed habits and I feel is ETOH use and possibly ILEANA may be complicating BP control    continue current medications at current doses Check blood pressure readings outside of the clinic several times per week, write down values, and follow up if elevated within the next several weeks. Blood pressure can be checked at the firestation, drugstore,  or any valid site.     Erectile dysfunction, unspecified erectile dysfunction type  Well controlled, no med side effects     Class 2 severe obesity due to excess calories with serious comorbidity and body mass index (BMI) of 39.0 to 39.9 in adult (H)  Needs work, Continue to work on healthy diet and exercise, discussed healthy habits     Snoring  High risk for ILEANA given habitus and snoring, may be complicating BP control, recommend sleep eval-referred      Review of the result(s) of each unique test - labs  Ordering of each unique test  Prescription drug management         FUTURE APPOINTMENTS:       - Follow-up visit in 6 mo  Work on weight loss  Regular exercise    No follow-ups on file.    Rich Dumont MD  [facility]    Subjective   Michael is a 62 year old who presents for the following health issues     HPI     Hypertension [id number] on amlodipine 2/22      Do you check your blood pressure regularly outside of the clinic? Yes  Was all normal but up some in last few weeks    Are you following a low salt diet? No    Are your blood pressures ever more than 140 on the top number (systolic) OR more   than 90 on the bottom number (diastolic), for example 140/90? Yes      How many servings of fruits and vegetables do you eat daily?  2-3    On average, how many sweetened beverages do you drink each day (Examples: soda, juice, sweet tea, etc.  Do NOT count diet or artificially sweetened beverages)?   1    How many days per week do you exercise  "enough to make your heart beat faster? 6    How many minutes a day do you exercise enough to make your heart beat faster? 30 - 60    How many days per week do you miss taking your medication? 0    Pt feels viagra working well, no side effects other than mild flushing    Pt was eating better but then was travelling again and got of track    Pt drinks 2-3 drinks most nights    Pt states he snores loudly- has never dewayne checked for ILEANA    Review of Systems   Constitutional, HEENT, cardiovascular, pulmonary, gi and gu systems are negative, except as otherwise noted.      Objective    BP (!) 138/94 (BP Location: Right arm, Patient Position: Chair, Cuff Size: Adult Large)   Resp 20   Ht 1.778 m (5' 10\")   Wt 125.3 kg (276 lb 3.2 oz)   BMI 39.63 kg/m    Body mass index is 39.63 kg/m .  Physical Exam   GENERAL: healthy, alert and no distress  NECK: no adenopathy, no asymmetry, masses, or scars and thyroid normal to palpation  RESP: lungs clear to auscultation - no rales, rhonchi or wheezes  CV: regular rate and rhythm, normal S1 S2, no S3 or S4, no murmur, click or rub, no peripheral edema and peripheral pulses strong  ABDOMEN: soft, nontender, no hepatosplenomegaly, no masses and bowel sounds normal  MS: no gross musculoskeletal defects noted, no edema    Office Visit on 02/02/2022   Component Date Value Ref Range Status     Carbon Dioxide 02/02/2022 30.5  20 - 32 mmol/L Final     Creatinine 02/02/2022 0.90  0.60 - 1.30 mg/dL Final     Glucose 02/02/2022 117 (A) 60 - 99 mg/dL Final     Sodium 02/02/2022 140.5  135 - 146 mmol/L Final     Potassium 02/02/2022 4.57  3.5 - 5.3 mmol/L Final     Chloride 02/02/2022 101.4  98 - 110 mmol/L Final     Protein Total 02/02/2022 7.4  6.1 - 8.1 g/dL Final     Albumin 02/02/2022 4.5  3.6 - 5.1 g/dL Final     Alkaline Phosphatase 02/02/2022 44  33 - 130 U/L Final     ALT 02/02/2022 75 (A) 0 - 32 U/L Final     AST 02/02/2022 42 (A) 0 - 35 U/L Final     Bilirubin Total 02/02/2022 0.7  " 0.2 - 1.2 mg/dL Final     Urea Nitrogen 02/02/2022 18  7 - 25 mg/dL Final     Calcium 02/02/2022 9.7  8.6 - 10.3 mg/dL Final     BUN/Creatinine Ratio 02/02/2022 20.0  6 - 22 Final     Globulin Calculated 02/02/2022 2.9  1.9 - 3.7 Final     A/G Ratio 02/02/2022 1.6  1 - 2.5 Final     Cholesterol 02/02/2022 187  0 - 199 mg/dL Final     Triglycerides 02/02/2022 139  0 - 149 mg/dL Final     HDL Cholesterol 02/02/2022 47  40 - 150 mg/dL Final     LDL Cholesterol Direct 02/02/2022 112  0 - 130 mg/dL Final     Cholesterol/HDL Ratio 02/02/2022 4  0 - 5 Final     Abbott PSA 02/02/2022 2.26  < OR = 4.00 ng/mL Final    Comment: The total PSA value from this assay system is   standardized against the WHO standard. The test   result will be approximately 20% lower when compared   to the equimolar-standardized total PSA (Essie   [place]). Comparison of serial PSA results should be   interpreted with this fact in mind.     This test was performed using the Siemens   chemiluminescent method. Values obtained from   different assay methods cannot be used  interchangeably. PSA levels, regardless of  value, should not be interpreted as absolute  evidence of the presence or absence of disease.       HCV Antibody 02/02/2022 NON-REACTIVE  NON-REACTIVE Final     SIGNAL TO CUT OFF - [facility] 02/02/2022 0.01  <1.00 Final    Comment:    HCV antibody was non-reactive. There is no laboratory   evidence of HCV infection.     In most cases, no further action is required. However,  if recent HCV exposure is suspected, a test for HCV RNA  (test code 91884) is suggested.     For additional information please refer to  http://education.[employer].Foodcloud/faq/EPU86h8  (This link is being provided for informational/  educational purposes only.)

## 2022-06-08 NOTE — NURSING NOTE
Mayur Celeste is here for a blood pressure check and medication refill.  Questioned patient about current smoking habits.  Pt. has never smoked.  Body mass index is 33.67 kg/(m^2).  PULSE regular  My Chart: active    Pre-visit planning  Immunizations - up to date  Colonoscopy - is due  Mammogram -   Asthma -   PHQ9 -    [id number] -

## 2022-09-12 ASSESSMENT — SLEEP AND FATIGUE QUESTIONNAIRES
HOW LIKELY ARE YOU TO NOD OFF OR FALL ASLEEP IN A CAR, WHILE STOPPED FOR A FEW MINUTES IN TRAFFIC: WOULD NEVER DOZE
HOW LIKELY ARE YOU TO NOD OFF OR FALL ASLEEP WHILE WATCHING TV: MODERATE CHANCE OF DOZING
HOW LIKELY ARE YOU TO NOD OFF OR FALL ASLEEP WHILE LYING DOWN TO REST IN THE AFTERNOON WHEN CIRCUMSTANCES PERMIT: SLIGHT CHANCE OF DOZING
HOW LIKELY ARE YOU TO NOD OFF OR FALL ASLEEP WHEN YOU ARE A PASSENGER IN A CAR FOR AN HOUR WITHOUT A BREAK: WOULD NEVER DOZE
HOW LIKELY ARE YOU TO NOD OFF OR FALL ASLEEP WHILE SITTING INACTIVE IN A PUBLIC PLACE: WOULD NEVER DOZE
HOW LIKELY ARE YOU TO NOD OFF OR FALL ASLEEP WHILE SITTING QUIETLY AFTER LUNCH WITHOUT ALCOHOL: WOULD NEVER DOZE
HOW LIKELY ARE YOU TO NOD OFF OR FALL ASLEEP WHILE SITTING AND READING: WOULD NEVER DOZE
HOW LIKELY ARE YOU TO NOD OFF OR FALL ASLEEP WHILE SITTING AND TALKING TO SOMEONE: WOULD NEVER DOZE

## 2022-09-14 ENCOUNTER — OFFICE VISIT (OUTPATIENT)
Dept: SLEEP MEDICINE | Facility: CLINIC | Age: 62
End: 2022-09-14
Payer: COMMERCIAL

## 2022-09-14 VITALS
HEIGHT: 70 IN | WEIGHT: 282 LBS | OXYGEN SATURATION: 94 % | BODY MASS INDEX: 40.37 KG/M2 | DIASTOLIC BLOOD PRESSURE: 98 MMHG | SYSTOLIC BLOOD PRESSURE: 147 MMHG | RESPIRATION RATE: 18 BRPM | HEART RATE: 64 BPM

## 2022-09-14 DIAGNOSIS — G47.33 OSA (OBSTRUCTIVE SLEEP APNEA): Primary | ICD-10-CM

## 2022-09-14 DIAGNOSIS — R06.83 SNORING: ICD-10-CM

## 2022-09-14 PROBLEM — Z71.89 ACP (ADVANCE CARE PLANNING): Status: RESOLVED | Noted: 2020-12-29 | Resolved: 2022-09-14

## 2022-09-14 PROCEDURE — 99204 OFFICE O/P NEW MOD 45 MIN: CPT | Performed by: INTERNAL MEDICINE

## 2022-09-14 NOTE — PATIENT INSTRUCTIONS
"      MY TREATMENT INFORMATION FOR SLEEP APNEA-  Mayur Celeste    DOCTOR : TARSHA SANTANA MD    Am I having a sleep study at a sleep center?  --->Due to normal delays, you will be contacted within 2-4 weeks to schedule    Am I having a home sleep study?  --->Watch the video for the device you are using:    -/drop off device-   https://www.Visualead.com/watch?v=yGGFBdELGhk  Frequently asked questions:  1. What is Obstructive Sleep Apnea (ILEANA)? ILEANA is the most common type of sleep apnea. Apnea means, \"without breath.\"  Apnea is most often caused by narrowing or collapse of the upper airway as muscles relax during sleep.   Almost everyone has occasional apneas. Most people with sleep apnea have had brief interruptions at night frequently for many years.  The severity of sleep apnea is related to how frequent and severe the events are.   2. What are the consequences of ILEANA? Symptoms include: feeling sleepy during the day, snoring loudly, gasping or stopping of breathing, trouble sleeping, and occasionally morning headaches or heartburn at night.  Sleepiness can be serious and even increase the risk of falling asleep while driving. Other health consequences may include development of high blood pressure and other cardiovascular disease in persons who are susceptible. Untreated ILEANA  can contribute to heart disease, stroke and diabetes.   3. What are the treatment options? In most situations, sleep apnea is a lifelong disease that must be managed with daily therapy. Medications are not effective for sleep apnea and surgery is generally not considered until other therapies have been tried. Your treatment is your choice . Continuous Positive Airway (CPAP) works right away and is the therapy that is effective in nearly everyone. An oral device to hold your jaw forward is usually the next most reliable option. Other options include postioning devices (to keep you off your back), weight loss, and surgery including a " tongue pacing device. There is more detail about some of these options below.  4. Are my sleep studies covered by insurance? Although we will request verification of coverage, we advise you also check in advance of the study to ensure there is coverage.    Important tips for those choosing CPAP and similar devices   Know your equipment:  CPAP is continuous positive airway pressure that prevents obstructive sleep apnea by keeping the throat from collapsing while you are sleeping. In most cases, the device is  smart  and can slowly self-adjusts if your throat collapses and keeps a record every day of how well you are treated-this information is available to you and your care team.  BPAP is bilevel positive airway pressure that keeps your throat open and also assists each breath with a pressure boost to maintain adequate breathing.  Special kinds of BPAP are used in patients who have inadequate breathing from lung or heart disease. In most cases, the device is  smart  and can slowly self-adjusts to assist breathing. Like CPAP, the device keeps a record of how well you are treated.  Your mask is your connection to the device. You get to choose what feels most comfortable and the staff will help to make sure if fits. Here: are some examples of the different masks that are available:       Key points to remember on your journey with sleep apnea:  Sleep study.  PAP devices often need to be adjusted during a sleep study to show that they are effective and adjusted right.  Good tips to remember: Try wearing just the mask during a quiet time during the day so your body adapts to wearing it. A humidifier is recommended for comfort in most cases to prevent drying of your nose and throat. Allergy medication from your provider may help you if you are having nasal congestion.  Getting settled-in. It takes more than one night for most of us to get used to wearing a mask. Try wearing just the mask during a quiet time during the day  so your body adapts to wearing it. A humidifier is recommended for comfort in most cases. Our team will work with you carefully on the first day and will be in contact within 4 days and again at 2 and 4 weeks for advice and remote device adjustments. Your therapy is evaluated by the device each day.   Use it every night. The more you are able to sleep naturally for 7-8 hours, the more likely you will have good sleep and to prevent health risks or symptoms from sleep apnea. Even if you use it 4 hours it helps. Occasionally all of us are unable to use a medical therapy, in sleep apnea, it is not dangerous to miss one night.   Communicate. Call our skilled team on the number provided on the first day if your visit for problems that make it difficult to wear the device. Over 2 out of 3 patients can learn to wear the device long-term with help from our team. Remember to call our team or your sleep providers if you are unable to wear the device as we may have other solutions for those who cannot adapt to mask CPAP therapy. It is recommended that you sleep your sleep provider within the first 3 months and yearly after that if you are not having problems.   Use it for your health. We encourage use of CPAP masks during daytime quiet periods to allow your face and brain to adapt to the sensation of CPAP so that it will be a more natural sensation to awaken to at night or during naps. This can be very useful during the first few weeks or months of adapting to CPAP though it does not help medically to wear CPAP during wakefulness and  should not be used as a strategy just to meet guidelines.  Take care of your equipment. Make sure you clean your mask and tubing using directions every day and that your filter and mask are replaced as recommended or if they are not working.     BESIDES CPAP, WHAT OTHER THERAPIES ARE THERE?    Positioning Device  Positioning devices are generally used when sleep apnea is mild and only occurs on  your back.This example shows a pillow that straps around the waist. It may be appropriate for those whose sleep study shows milder sleep apnea that occurs primarily when lying flat on one's back. Preliminary studies have shown benefit but effectiveness at home may need to be verified by a home sleep test. These devices are generally not covered by medical insurance.  Examples of devices that maintain sleeping on the back to prevent snoring and mild sleep apnea.    Belt type body positioner  http://[employer].oDesk/    Electronic reminder  http://nightshifttherapy.com/            Oral Appliance  What is oral appliance therapy?  An oral appliance device fits on your teeth at night like a retainer used after having braces. The device is made by a specialized dentist and requires several visits over 1-2 months before a manufactured device is made to fit your teeth and is adjusted to prevent your sleep apnea. Once an oral device is working properly, snoring should be improved. A home sleep test may be recommended at that time if to determine whether the sleep apnea is adequately treated.       Some things to remember:  -Oral devices are often, but not always, covered by your medical insurance. Be sure to check with your insurance provider.   -If you are referred for oral therapy, you will be given a list of specialized dentists to consider or you may choose to visit the Web site of the American Academy of Dental Sleep Medicine  -Oral devices are less likely to work if you have severe sleep apnea or are extremely overweight.     More detailed information  An oral appliance is a small acrylic device that fits over the upper and lower teeth  (similar to a retainer or a mouth guard). This device slightly moves jaw forward, which moves the base of the tongue forward, opens the airway, improves breathing for effective treat snoring and obstructive sleep apnea in perhaps 7 out of 10 people .  The best working devices are  custom-made by a dental device  after a mold is made of the teeth 1, 2, 3.  When is an oral appliance indicated?  Oral appliance therapy is recommended as a first-line treatment for patients with primary snoring, mild sleep apnea, and for patients with moderate sleep apnea who prefer appliance therapy to use of CPAP4, 5. Severity of sleep apnea is determined by sleep testing and is based on the number of respiratory events per hour of sleep.   How successful is oral appliance therapy?  The success rate of oral appliance therapy in patients with mild sleep apnea is 75-80% while in patients with moderate sleep apnea it is 50-70%. The chance of success in patients with severe sleep apnea is 40-50%. The research also shows that oral appliances have a beneficial effect on the cardiovascular health of ILEANA patients at the same magnitude as CPAP therapy7.  Oral appliances should be a second-line treatment in cases of severe sleep apnea, but if not completely successful then a combination therapy utilizing CPAP plus oral appliance therapy may be effective. Oral appliances tend to be effective in a broad range of patients although studies show that the patients who have the highest success are females, younger patients, those with milder disease, and less severe obesity. 3, 6.   Finding a dentist that practices dental sleep medicine  Specific training is available through the American Academy of Dental Sleep Medicine for dentists interested in working in the field of sleep. To find a dentist who is educated in the field of sleep and the use of oral appliances, near you, visit the Web site of the American Academy of Dental Sleep Medicine.    References  1. Harika et al. Objectively measured vs self-reported compliance during oral appliance therapy for sleep-disordered breathing. Chest 2013; 144(5): 9648-8918.  2. David et al. Objective measurement of compliance during oral appliance therapy for  sleep-disordered breathing. Thorax 2013; 68(1): 91-96.  3. Elisa et al. Mandibular advancement devices in 620 men and women with ILEANA and snoring: tolerability and predictors of treatment success. Chest 2004; 125: 5021-6135.  4. Mandy et al. Oral appliances for snoring and ILEANA: a review. Sleep 2006; 29: 244-262.  5. Lacey et al. Oral appliance treatment for ILEANA: an update. J Clin Sleep Med 2014; 10(2): 215-227.  6. Jessica et al. Predictors of OSAH treatment outcome. J Dent Res 2007; 86: 6378-7975.      Weight Loss:    Weight loss is a long-term strategy that may improve sleep apnea in some patients.    Weight management is a personal decision and the decision should be based on your interest and the potential benefits.  If you are interested in exploring weight loss strategies, the following discussion covers the impact on weight loss on sleep apnea and the approaches that may be successful.    Being overweight does not necessarily mean you will have health consequences.  Those who have BMI over 35 or over 27 with existing medical conditions carries greater risk.   Weight loss decreases severity of sleep apnea in most people with obesity. For those with mild obesity who have developed snoring with weight gain, even 15-30 pound weight loss can improve and occasionally eliminate sleep apnea.  Structured and life-long dietary and health habits are necessary to lose weight and keep healthier weight levels.     Though there may be significant health benefits from weight loss, long-term weight loss is very difficult to achieve- studies show success with dietary management in less than 10% of people. In addition, substantial weight loss may require years of dietary control and may be difficult if patients have severe obesity. In these cases, surgical management may be considered.  Finally, older individuals who have tolerated obesity without health complications may be less likely to benefit from weight loss  strategies.      [unfilled]    Surgery:    Surgery for obstructive sleep apnea is considered generally only when other therapies fail to work. Surgery may be discussed with you if you are having a difficult time tolerating CPAP and or when there is an abnormal structure that requires surgical correction.  Nose and throat surgeries often enlarge the airway to prevent collapse.  Most of these surgeries create pain for 1-2 weeks and up to half of the most common surgeries are not effective throughout life.  You should carefully discuss the benefits and drawbacks to surgery with your sleep provider and surgeon to determine if it is the best solution for you.   More information  Surgery for ILEANA is directed at areas that are responsible for narrowing or complete obstruction of the airway during sleep.  There are a wide range of procedures available to enlarge and/or stabilize the airway to prevent blockage of breathing in the three major areas where it can occur: the palate, tongue, and nasal regions.  Successful surgical treatment depends on the accurate identification of the factors responsible for obstructive sleep apnea in each person.  A personalized approach is required because there is no single treatment that works well for everyone.  Because of anatomic variation, consultation with an examination by a sleep surgeon is a critical first step in determining what surgical options are best for each patient.  In some cases, examination during sedation may be recommended in order to guide the selection of procedures.  Patients will be counseled about risks and benefits as well as the typical recovery course after surgery. Surgery is typically not a cure for a person s ILEANA.  However, surgery will often significantly improve one s ILEANA severity (termed  success rate ).  Even in the absence of a cure, surgery will decrease the cardiovascular risk associated with OSA7; improve overall quality of life8 (sleepiness,  functionality, sleep quality, etc).      Palate Procedures:  Patients with ILEANA often have narrowing of their airway in the region of their tonsils and uvula.  The goals of palate procedures are to widen the airway in this region as well as to help the tissues resist collapse.  Modern palate procedure techniques focus on tissue conservation and soft tissue rearrangement, rather than tissue removal.  Often the uvula is preserved in this procedure. Residual sleep apnea is common in patient after pharyngoplasty with an average reduction in sleep apnea events of 33%2.      Tongue Procedures:  ExamWhile patients are awake, the muscles that surround the throat are active and keep this region open for breathing. These muscles relax during sleep, allowing the tongue and other structures to collapse and block breathing.  There are several different tongue procedures available.  Selection of a tongue base procedure depends on characteristics seen on physical exam.  Generally, procedures are aimed at removing bulky tissues in this area or preventing the back of the tongue from falling back during sleep.  Success rates for tongue surgery range from 50-62%3.    Hypoglossal Nerve Stimulation:  Hypoglossal nerve stimulation has recently received approval from the [place] Food and Drug Administration for the treatment of obstructive sleep apnea.  This is based on research showing that the system was safe and effective in treating sleep apnea6.  Results showed that the median AHI score decreased 68%, from 29.3 to 9.0. This therapy uses an implant system that senses breathing patterns and delivers mild stimulation to airway muscles, which keeps the airway open during sleep.  The system consists of three fully implanted components: a small generator (similar in size to a pacemaker), a breathing sensor, and a stimulation lead.  Using a small handheld remote, a patient turns the therapy on before bed and off upon awakening.     Candidates for this device must be greater than 22 years of age, have moderate to severe ILEANA (AHI between 20-65), BMI less than 32, have tried CPAP/oral appliance without success, and have appropriate upper airway anatomy (determined by a sleep endoscopy performed by Dr. Dobson).    Hypoglossal Nerve Stimulation Pathway:    The sleep surgeon s office will work with the patient through the insurance prior-authorization process (including communications and appeals).    Nasal Procedures:  Nasal obstruction can interfere with nasal breathing during the day and night.  Studies have shown that relief of nasal obstruction can improve the ability of some patients to tolerate positive airway pressure therapy for obstructive sleep apnea1.  Treatment options include medications such as nasal saline, topical corticosteroid and antihistamine sprays, and oral medications such as antihistamines or decongestants. Non-surgical treatments can include external nasal dilators for selected patients. If these are not successful by themselves, surgery can improve the nasal airway either alone or in combination with these other options.      Combination Procedures:  Combination of surgical procedures and other treatments may be recommended, particularly if patients have more than one area of narrowing or persistent positional disease.  The success rate of combination surgery ranges from 66-80%2,3.    References  Paulo MARCELO. The Role of the Nose in Snoring and Obstructive Sleep Apnoea: An Update.  Eur Arch Otorhinolaryngol. 2011; 268: 1365-73.   Zora SM; Kiki JA; Destiny JR; Pallanch JF; Juan Carlos MB; Klaus SG; Riccardo ALVAREZ. Surgical modifications of the upper airway for obstructive sleep apnea in adults: a systematic review and meta-analysis. SLEEP 2010;33(10):7627-1493. Giovanny WATT. Hypopharyngeal surgery in obstructive sleep apnea: an evidence-based medicine review.  Arch Otolaryngol Head Neck Surg. 2006 Feb;132(2):206-13.  Abhi PHILLIP,  Sunita Y, Casimiro IRISH. The efficacy of anatomically based multilevel surgery for obstructive sleep apnea. Otolaryngol Head Neck Surg. 2003 Oct;129(4):327-35.  Giovanny WATT, Goldberg A. Hypopharyngeal Surgery in Obstructive Sleep Apnea: An Evidence-Based Medicine Review. Arch Otolaryngol Head Neck Surg. 2006 Feb;132(2):206-13.  Luba DAVIS et al. Upper-Airway Stimulation for Obstructive Sleep Apnea.  N Engl J Med. 2014 Jan 9;370(2):139-49.  Jalil Y et al. Increased Incidence of Cardiovascular Disease in Middle-aged Men with Obstructive Sleep Apnea. Am J Respir Crit Care Med; 2002 166: 159-165  Rao EM et al. Studying Life Effects and Effectiveness of Palatopharyngoplasty (SLEEP) study: Subjective Outcomes of Isolated Uvulopalatopharyngoplasty. Otolaryngol Head Neck Surg. 2011; 144: 623-631.        WHAT IF I ONLY HAVE SNORING?    Mandibular advancement devices, lateral sleep positioning, long-term weight loss and treatment of nasal allergies have been shown to improve snoring.  Exercising tongue muscles with a game (Finariottps://apps.SwimTopia/us/agustin/soundly-reduce-snoring/hx9730061407) or stimulating the tongue during the day with a device (https://doi.org/10.3390/vsj84790640) have improved snoring in some individuals.    Remember to Drive Safe... Drive Alive     Sleep health profoundly affects your health, mood, and your safety.  Thirty three percent of the population (one in three of us) is not getting enough sleep and many have a sleep disorder. Not getting enough sleep or having an untreated / undertreated sleep condition may make us sleepy without even knowing it. In fact, our driving could be dramatically impaired due to our sleep health. As your provider, here are some things I would like you to know about driving:     Here are some warning signs for impairment and dangerous drowsy driving:              -Having been awake more than 16 hours               -Looking tired               -Eyelid drooping              -Head  nodding (it could be too late at this point)              -Driving for more than 30 minutes     Some things you could do to make the driving safer if you are experiencing some drowsiness:              -Stop driving and rest              -Call for transportation              -Make sure your sleep disorder is adequately treated     Some things that have been shown NOT to work when experiencing drowsiness while driving:              -Turning on the radio              -Opening windows              -Eating any  distracting  /  entertaining  foods (e.g., sunflower seeds, candy, or any other)              -Talking on the phone      Your decision may not only impact your life, but also the life of others. Please, remember to drive safe for yourself and all of us.           Your BMI is Body mass index is 40.46 kg/m .    What is BMI?  Body mass index (BMI) is one way to tell whether you are at a healthy weight, overweight, or obese. It measures your weight in relation to your height.  A BMI of 18.5 to 24.9 is in the healthy range. A person with a BMI of 25 to 29.9 is considered overweight, and someone with a BMI of 30 or greater is considered obese.  Another way to find out if you are at risk for health problems caused by overweight and obesity is to measure your waist. If you are a woman and your waist is more than 35 inches, or if you are a man and your waist is more than 40 inches, your risk of disease may be higher.  More than two-thirds of American adults are considered overweight or obese. Being overweight or obese increases the risk for further weight gain.  Excess weight may lead to heart disease and diabetes. Creating and following plans for healthy eating and physical activity may help you improve your health.    Methods for maintaining or losing weight.  Weight control is part of healthy lifestyle and includes exercise, emotional health, and healthy eating habits.  Careful eating habits lifelong is the mainstay of  weight control.  Though there are significant health benefits from weight loss, long-term weight loss with diet alone may be very difficult to achieve- studies show long-term success with dietary management in less than 10% of people. Attaining a healthy weight may be especially difficult to achieve in those with severe obesity. In some cases, medications, devices and surgical management might be considered.    What can you do?  If you are overweight or obese and are interested in methods for weight loss, you should discuss this with your provider. In addition, we recommend that you review healthy life styles and methods for weight loss available through the National Institutes of Health patient information sites:   http://win.niddk.nih.gov/publications/index.htm

## 2022-09-14 NOTE — PROGRESS NOTES
Name: Mayur Celeste MRN# [id number]   Age: 62 year old YOB: 1960     Date of Consultation: September 14, 2022  Consultation is requested by: Rich uDmont MD  [address] [address] Rich Dumont  Primary care provider: Rich Dumont       Reason for Sleep Consult:     Mayur Celeste is sent by Rich Dumont for a sleep consultation regarding difficulty control hypertension in the setting of snoring and possible sleep apnea    Patient s Reason for visit  Mayur Celeste main reason for visit: Primary care  ordered an evaluation for sleep apnea to determine if it could be contributing to high blood pressure  Patient states problem(s) started: 2-3 years  Mayur Celeste's goals for this visit: Determine if a sleep apnea home test is warranted           Assessment and Plan:     Summary Sleep Diagnoses:    Clinical signs and symptoms of obstructive sleep apnea (STOP-BANG 4)    Comorbid Diagnoses:    Hypertension    BMI 3  Summary Recommendations:    Patient should review home sleep test video in his after visit summary as well as the treatment options we discussed today    Patient will be called for scheduled pickup and drop off of the home sleep test and contacted by Julian with findings and recommendations    Summary Counseling: We reviewed potential complications of untreated sleep apnea as well as alternative strategies including device therapies and surgical management.           HISTORY PRESENT ILLNESS:     Mayur Celeste is a 62 year old year old male with worsening snoring and nonrestorative sleep associated with weight gain and increasingly difficult blood pressure control.  He denies pathologic daytime sleepiness but does acknowledge drowsiness while driving and turning over driving to other individuals.  He has never had a near miss accident.  He has not had expressed cardiovascular disease with the exception  of hypertension.           SLEEP-WAKE SCHEDULE:      Work/School Days: Patient goes to school/work: No   Usually gets into bed at 11-midnight  Takes patient about 5-10 minutes to fall asleep  Has trouble falling asleep 0 nights per week  Wakes up in the middle of the night 1-2 times.  Wakes up due to Use the bathroom  He has trouble falling back asleep 0 times a week.   It usually takes 5 min or less to get back to sleep  Patient is usually up at 7-9am  Uses alarm: No    Weekends/Non-work Days/All Other Days:  Usually gets into bed at 11-midnight   Takes patient about 5-10 minutes to fall asleep  Patient is usually up at 7-9am  Uses alarm: No    Sleep Need  Patient gets  7-9 hours sleep on average   Patient thinks he needs about 8-9 hours sleep    Mayur Celeste prefers to sleep in this position(s): Side   Patient states they do the following activities in bed: Read, Use phone, computer, or tablet    Naps  Patient takes a purposeful nap 0-1 times a week and naps are usually 20-40 min in duration  He feels better after a nap: Yes  He dozes off unintentionally 5 days per week  Patient has had a driving accident or near-miss due to sleepiness/drowsiness: No      SLEEP DISRUPTIONS:      Breathing/Snoring    Snoring:Yes  Other people complain about his snoring: Yes  Others observehe stops breathing in his sleep: No  He has issues with the following: Morning mouth dryness, Getting up to urinate more than once      Movement:    Pain, discomfort, with an urge to move:  Yes  Happens when he is resting:  No  Happens more at night:  No  Patient has been told he kicks his legs at night:  No                       Mayur Celeste has experienced the following behaviors while sleeping:    He has experienced sudden muscle weakness during the day: No      Is there anything else you would like your sleep provider to know:          CAFFEINE AND OTHER SUBSTANCES:    Patient consumes caffeinated beverages per day:  2-3  Last  caffeine use is usually: noon-3  List of any prescribed or over the counter stimulants that patient takes:    List of any prescribed or over the counter sleep medication patient takes:    List of previous sleep medications that patient has tried:    Patient drinks alcohol to help them sleep: No  Patient drinks alcohol near bedtime: Yes    Family History:  Patient has a family member been diagnosed with a sleep disorder: Yes  Father, mother, wife              SCALES:       EPWORTH SLEEPINESS SCALE      Matheson Sleepiness Scale ( NISHI Quinonez  [id number]<br>ESS - [place]/English - Final version - 21 Nov 07 - [facility][1] Research [place].) 9/12/2022   Sitting and reading Would never doze   Watching TV Moderate chance of dozing   Sitting, inactive in a public place (e.g. a theatre or a meeting) Would never doze   As a passenger in a car for an hour without a break Would never doze   Lying down to rest in the afternoon when circumstances permit Slight chance of dozing   Sitting and talking to someone Would never doze   Sitting quietly after a lunch without alcohol Would never doze   In a car, while stopped for a few minutes in traffic Would never doze   Matheson Score (MC) 3   Matheson Score (Sleep) 3         INSOMNIA SEVERITY INDEX (YURI)      Insomnia Severity Index (YURI) 9/12/2022   Difficulty falling asleep 0   Difficulty staying asleep 0   Problems waking up too early 0   How SATISFIED/DISSATISFIED are you with your CURRENT sleep pattern? 1   How NOTICEABLE to others do you think your sleep problem is in terms of impairing the quality of your life? 0   How WORRIED/DISTRESSED are you about your current sleep problem? 1   To what extent do you consider your sleep problem to INTERFERE with your daily functioning (e.g. daytime fatigue, mood, ability to function at work/daily chores, concentration, memory, mood, etc.) CURRENTLY? 1   YURI Total Score 3       Guidelines for Scoring/Interpretation:  Total score categories:  0-7 = No  "clinically significant insomnia   8-14 = Subthreshold insomnia   15-21 = Clinical insomnia (moderate severity)  22-28 = Clinical insomnia (severe)  Used via courtesy of www.myhealth.va.gov with permission from Pavan Ryder PhD., [facility]      STOP BANG     STOP BANG Questionnaire (  2008, the American Society of Anesthesiologists, Inc. [employer] 9/12/2022   1. Snoring - Do you snore loudly (louder than talking or loud enough to be heard through closed doors)? Yes   2. Tired - Do you often feel tired, fatigued, or sleepy during daytime? Yes   3. Observed - Has anyone observed you stop breathing during your sleep? No   4. Blood pressure - Do you have or are you being treated for high blood pressure? Yes   5. BMI - BMI more than 35 kg/m2? Yes   6. Age - Age over 50 yr old? Yes   7. Neck circumference - Neck circumference greater than 40 cm? Yes   8. Gender - Gender male? Yes   STOP BANG Score (MC): 6 (High risk of ILEANA)   B/P Clinic: -   BMI Clinic: -         GAD7    No flowsheet data found.      CAGE-AID    No flowsheet data found.    CAGE-AID reprinted with permission from the [place] Medical Journal, ANA Rm. and BRITTANY Valdivia, \"Conjoint screening questionnaires for alcohol and drug abuse\" [place] Medical Journal 94: 135-140, 1995.      PATIENT HEALTH QUESTIONNAIRE-9 (PHQ - 9)    No flowsheet data found.    Developed by Eleazar Núñez, Debra Mullins, Magdaleno Adhikari and colleagues, with an educational fran from [employer] No permission required to reproduce, translate, display or distribute.        Allergies:    No Known Allergies         Problem List:     Patient Active Problem List   Diagnosis     Status post THR (total hip replacement)     Osteoarthritis     ACP (advance care planning)     Health Care Home     Essential hypertension, benign     Family history of prostate cancer     Morbid obesity (H)            MEDICATIONS:     Current Outpatient Medications "   Medication Sig Dispense Refill     amLODIPine (NORVASC) 5 MG tablet Take 1 tablet (5 mg) by mouth daily 90 tablet 1     lisinopril-hydrochlorothiazide (ZESTORETIC) 20-25 MG tablet Take 1 tablet by mouth daily TK 1 T PO D 90 tablet 3     naproxen sodium (ANAPROX) 220 MG tablet Take 220 mg by mouth 2 times daily (with meals).       sildenafil (VIAGRA) 100 MG tablet Take 1 tablet (100 mg) by mouth daily as needed 30 tablet 1       Problem List:  Patient Active Problem List    Diagnosis Date Noted     ACP (advance care planning) 12/29/2020     Priority: Medium     Health Care Home 12/29/2020     Priority: Medium     Morbid obesity (H) 12/29/2020     Priority: Medium     Status post THR (total hip replacement) 12/21/2011     Priority: Medium     Osteoarthritis 12/21/2011     Priority: Medium     Essential hypertension, benign 12/07/2010     Priority: Medium     Family history of prostate cancer 12/07/2010     Priority: Medium        Past Medical/Surgical History:  Past Medical History:   Diagnosis Date     Chronic pain     back pain     Hypertension      PONV (postoperative nausea and vomiting)      Past Surgical History:   Procedure Laterality Date     ARTHROPLASTY HIP      left     ARTHROPLASTY HIP  12/21/2011    Procedure:ARTHROPLASTY HIP; Right Total Hip Arthroplasty, Right Hip Hardware Removal     ; Surgeon:ISAAC RODRIGUEZ; Location:[facility] OR     ORTHOPEDIC SURGERY      right hip pinning       Social History:  Social History     Socioeconomic History     Marital status:      Spouse name: Not on file     Number of children: Not on file     Years of education: Not on file     Highest education level: Not on file   Occupational History     Occupation: retired   Tobacco Use     Smoking status: Never Smoker     Smokeless tobacco: Never Used   Substance and Sexual Activity     Alcohol use: Yes     Comment: occ     Drug use: No     Sexual activity: Not on file   Other Topics Concern     Not on file   Social History  Narrative     Not on file     Social Determinants of Health     Financial Resource Strain: Not on file   Food Insecurity: Not on file   Transportation Needs: Not on file   Physical Activity: Not on file   Stress: Not on file   Social Connections: Not on file   Intimate Partner Violence: Not on file   Housing Stability: Not on file       Family History:  Family History   Problem Relation Age of Onset     Prostate Cancer Father      Coronary Artery Disease Father      Colon Cancer Other      Cerebrovascular Disease Other        Review of Systems:  A complete review of systems reviewed by me is negative with the exeption of what has been mentioned in the history of present illness.  In the last TWO WEEKS have you experienced any of the following symptoms?  Fevers: No  Night Sweats: No  Weight Gain: No  Pain at Night: No  Double Vision: No  Changes in Vision: No  Difficulty Breathing through Nose: No  Sore Throat in Morning: No  Dry Mouth in the Morning: Yes  Shortness of Breath Lying Flat: No  Shortness of Breath With Activity: No  Awakening with Shortness of Breath: No  Increased Cough: No  Heart Racing at Night: No  Swelling in Feet or Legs: No  Diarrhea at Night: No  Heartburn at Night: No  Urinating More than Once at Night: Yes  Losing Control of Urine at Night: No  Joint Pains at Night: No  Headaches in Morning: No  Weakness in Arms or Legs: No  Depressed Mood: No  Anxiety: No          Physical Examination:     Mandibular profile    GENERAL: Healthy, alert and no distress  EYES: Eyes grossly normal to inspection.  No discharge or erythema, or obvious scleral/conjunctival abnormalities.  RESP: No audible wheeze, cough, or visible cyanosis.  No visible retractions or increased work of breathing.    SKIN: Visible skin clear. No significant rash, abnormal pigmentation or lesions.  NEURO: Cranial nerves grossly intact.  Mentation and speech appropriate for age.  PSYCH: Mentation appears normal, affect normal/bright,  judgement and insight intact, normal speech and appearance well-groomed.                  Data: All pertinent previous laboratory data reviewed     Recent Labs   Lab Test 02/02/22  0000 12/29/20  1210   .5 140.2   POTASSIUM 4.57 4.48   CHLORIDE 101.4 101.0   CO2 30.5 33.8*   * 112*   BUN 18  20.0 19  22.4*   CR 0.90 0.85   ALICIA 9.7 9.6       Recent Labs   Lab Test 12/29/20  0000   HGB 16.7       Recent Labs   Lab Test 02/02/22  0000   PROTTOTAL 7.4   ALBUMIN 4.5   BILITOTAL 0.7   ALKPHOS 44           TARSHA SANTANA MD 9/14/2022     Total time spent reviewing medical records including previous testing and interpretation as well as direct patient contact and documentation on this date: 45 minutes

## 2022-10-24 ENCOUNTER — OFFICE VISIT (OUTPATIENT)
Dept: SLEEP MEDICINE | Facility: CLINIC | Age: 62
End: 2022-10-24
Payer: COMMERCIAL

## 2022-10-24 DIAGNOSIS — G47.33 OSA (OBSTRUCTIVE SLEEP APNEA): ICD-10-CM

## 2022-10-24 PROCEDURE — G0399 HOME SLEEP TEST/TYPE 3 PORTA: HCPCS | Mod: 52 | Performed by: INTERNAL MEDICINE

## 2022-10-24 NOTE — PROGRESS NOTES
Pt is completing a home sleep test. Pt was instructed on how to put on the Noxturnal T3 device and associated equipment before going to bed and given the opportunity to practice putting it on before leaving the sleep center. Pt was reminded to bring the home sleep test kit back to the center tomorrow, at agreed upon time for download and reporting.   Ninfa Cee CMA, HST Specialist  [facility]

## 2022-10-25 NOTE — PROGRESS NOTES
This HSAT was performed using a Noxturnal T3 device which recorded snore, sound, movement activity, body position, nasal pressure, oronasal thermal airflow, pulse, oximetry and both chest and abdominal respiratory effort. HSAT data was restricted to the time patient states they were in bed.     HSAT was scored using 1B 4% hypopnea rule.     HST AHI (Non-PAT): 71.3  Snoring was reported as loud.  Time with SpO2 below 89% was 158.8 minutes.   Overall signal quality was good     Pt will follow up with sleep provider to determine appropriate therapy.

## 2022-10-31 NOTE — PROCEDURES
"HOME SLEEP STUDY INTERPRETATION        Patient: Mayur Celeste  MRN: [id number]  YOB: 1960  Study Date: 10/24/2022  PCP/Referring Provider: Rich Dumont;   Ordering Provider: Sterling Shore MD         Indications for Home Study: Mayur Celeste is a 62 year old male with a history of hypertension who presents with symptoms suggestive of obstructive sleep apnea.    Estimated body mass index is 40.46 kg/m  as calculated from the following:    Height as of 22: 1.778 m (5' 10\").    Weight as of 22: 127.9 kg (282 lb).  Total score - [place]: 3 (2022  9:51 AM)  STOP-BANGSTOP-BAN/8        Data: A full night home sleep study was performed recording the standard physiologic parameters including body position, movement, sound, nasal pressure, thermal oral airflow, chest and abdominal movements with respiratory inductance plethysmography, and oxygen saturation by pulse oximetry. Pulse rate was estimated by oximetry recording. This study was considered adequate based on > 4 hours of quality oximetry and respiratory recording. As specified by the AASM Manual for the Scoring of Sleep and Associated events, version 2.3, Rule VIII.D 1B, 4% oxygen desaturation scoring for hypopneas is used as a standard of care on all home sleep apnea testing.        Analysis Time: 308 minutes        Respiration:   Sleep Associated Hypoxemia: sustained hypoxemia was present. Baseline oxygen saturation was 92%.  Time with saturation less than or equal to 88% was 138 minutes. The lowest oxygen saturation was 64%.   Snoring: Snoring was present 8% of the time at 70 dB.  Respiratory events: The home study revealed a presence of 96 obstructive apneas and 0 mixed and central apneas. There were 270 hypopneas resulting in a combined apnea/hypopnea index [AHI] of 71 events per hour.  AHI was 68 per hour supine, 68 per hour prone, 86 per hour on left side, and 69 per hour on right side.   Pattern: Excluding " events noted above, respiratory rate and pattern was Normal.      Position: Percent of time spent: supine -34%, prone -1%, on left -16%, on right -49%.      Heart Rate: By pulse oximetry normal rate was noted.       Assessment:     Severe obstructive sleep apnea.    Sleep associated hypoxemia was present and hypoventilation cannot be excluded.    Recommendations:    Given the severity of sleep apnea and hypoxemia, the most immediately effective therapy is auto titration CPAP with virtual coordinated care to ensure effectiveness.  Reevaluation of hypoxemia on therapy is recommended and consideration for evaluation for nocturnal hypoventilation should be considered if it is persistent.    Suggest optimizing sleep hygiene and avoiding sleep deprivation.        Diagnosis Code(s): Obstructive Sleep Apnea G47.33, Hypoxemia G47.36, Snoring R06.83    TARSHA SANTANA MD, October 31, 2022   Diplomate, American Board of Internal Medicine, Sleep Medicine

## 2022-11-06 NOTE — PROGRESS NOTES
HST POST-STUDY QUESTIONNAIRE    1. What time did you go to bed?  22:50  2. How long do you think it took to fall asleep?  20-30 min  3. What time did you wake up to start the day?  07:00  4. Did you get up during the night at all?  Twice  5. If you woke up, do you remember approximately what time(s)?   6. Did you have any difficulty with the equipment?  Yes Found tubes to be tangled after applying stickers to face. Had to unplug from monitor box. Called sleep center and was told to plug back into PRES port  7. Did you us any type of treatment with this study?  None  8. Was the head of the bed elevated? No  9. Did you sleep in a recliner?  No  10. Did you stop using CPAP at least 3 days before this test?  NA  11. Any other information you'd like us to know?

## 2022-11-18 ENCOUNTER — DOCUMENTATION ONLY (OUTPATIENT)
Dept: HOME HEALTH SERVICES | Facility: CLINIC | Age: 62
End: 2022-11-18

## 2022-11-18 NOTE — PROGRESS NOTES
Patient came in to the [facility] for a CPAP setup.  We discussed the equipment and the cost.  He has a high deductible and would be responsible for most, if not all, of the cost.  He chose to go with another vendor.  I still instructed him on the use and care of the equipment.

## 2022-12-01 ENCOUNTER — TRANSFERRED RECORDS (OUTPATIENT)
Dept: FAMILY MEDICINE | Facility: CLINIC | Age: 62
End: 2022-12-01

## 2022-12-13 ENCOUNTER — OFFICE VISIT (OUTPATIENT)
Dept: FAMILY MEDICINE | Facility: CLINIC | Age: 62
End: 2022-12-13

## 2022-12-13 VITALS
TEMPERATURE: 97.9 F | RESPIRATION RATE: 20 BRPM | BODY MASS INDEX: 40.74 KG/M2 | WEIGHT: 284.6 LBS | SYSTOLIC BLOOD PRESSURE: 146 MMHG | HEIGHT: 70 IN | DIASTOLIC BLOOD PRESSURE: 86 MMHG | HEART RATE: 68 BPM

## 2022-12-13 DIAGNOSIS — E66.01 MORBID OBESITY (H): ICD-10-CM

## 2022-12-13 DIAGNOSIS — G47.33 OSA (OBSTRUCTIVE SLEEP APNEA): ICD-10-CM

## 2022-12-13 DIAGNOSIS — I10 ESSENTIAL HYPERTENSION: Primary | ICD-10-CM

## 2022-12-13 DIAGNOSIS — N52.9 ERECTILE DYSFUNCTION, UNSPECIFIED ERECTILE DYSFUNCTION TYPE: ICD-10-CM

## 2022-12-13 PROCEDURE — 80048 BASIC METABOLIC PNL TOTAL CA: CPT | Performed by: FAMILY MEDICINE

## 2022-12-13 PROCEDURE — 99213 OFFICE O/P EST LOW 20 MIN: CPT | Performed by: FAMILY MEDICINE

## 2022-12-13 PROCEDURE — 36415 COLL VENOUS BLD VENIPUNCTURE: CPT | Performed by: FAMILY MEDICINE

## 2022-12-13 RX ORDER — LISINOPRIL AND HYDROCHLOROTHIAZIDE 20; 25 MG/1; MG/1
1 TABLET ORAL DAILY
Qty: 90 TABLET | Refills: 1 | Status: SHIPPED | OUTPATIENT
Start: 2022-12-13 | End: 2023-07-03

## 2022-12-13 RX ORDER — AMLODIPINE BESYLATE 10 MG/1
10 TABLET ORAL DAILY
Qty: 90 TABLET | Refills: 1 | Status: SHIPPED | OUTPATIENT
Start: 2022-12-13 | End: 2023-06-07

## 2022-12-13 NOTE — NURSING NOTE
Mayur Celeste is here for a blood pressure check and medication refill.  Questioned patient about current smoking habits.  Pt. has never smoked.  Body mass index is 33.67 kg/(m^2).  PULSE regular  My Chart: active    Pre-visit planning  Immunizations - up to date  Colonoscopy - UTD  Mammogram -   Asthma -   PHQ9 -    BETITO-7 -    The patient has verbalized that it is ok to leave a detailed voice message on the patient's cell phone with results/recommendations from this visit.

## 2022-12-13 NOTE — PROGRESS NOTES
"  Assessment & Plan     Essential hypertension  suboptimal control, will try increased amlodipine, watch for edema, Check blood pressure readings outside of the clinic several times per week, write down values, and follow up if elevated within the next several weeks. Blood pressure can be checked at the firestation, drugstore,  or any valid site.     Erectile dysfunction, unspecified erectile dysfunction type      Morbid obesity (H)  Continue to work on healthy diet and exercise, discussed healthy habits     ILEANA (obstructive sleep apnea)  CPAP      Review of the result(s) of each unique test - labs  Ordering of each unique test  Prescription drug management         BMI:   Estimated body mass index is 40.84 kg/m  as calculated from the following:    Height as of this encounter: 1.778 m (5' 10\").    Weight as of this encounter: 129.1 kg (284 lb 9.6 oz).   Weight management plan: Discussed healthy diet and exercise guidelines    FUTURE APPOINTMENTS:       - Follow-up visit in 6 mo  Work on weight loss  Regular exercise    No follow-ups on file.    Rich Dumont MD  [facility]    Subjective   Michael is a 62 year old, presenting for the following health issues:  No chief complaint on file.      HPI     Hypertension Follow-up      Do you check your blood pressure regularly outside of the clinic? Yes  150's/90's at home    Are you following a low salt diet? No    Are your blood pressures ever more than 140 on the top number (systolic) OR more   than 90 on the bottom number (diastolic), for example 140/90? Yes     Pt diagnosed with ILEANA since last visit - now using CPAP for last few days-feels much beter      How many servings of fruits and vegetables do you eat daily?  2-3    On average, how many sweetened beverages do you drink each day (Examples: soda, juice, sweet tea, etc.  Do NOT count diet or artificially sweetened beverages)?   1    How many days per week do you exercise enough to make your " "heart beat faster? 6    How many minutes a day do you exercise enough to make your heart beat faster? 60 or more    How many days per week do you miss taking your medication? 0        Review of Systems   Constitutional, HEENT, cardiovascular, pulmonary, gi and gu systems are negative, except as otherwise noted.      Objective    BP (!) 146/86 (BP Location: Right arm, Patient Position: Chair, Cuff Size: Adult Regular)   Pulse 68   Temp 97.9  F (36.6  C) (Temporal)   Resp 20   Ht 1.778 m (5' 10\")   Wt 129.1 kg (284 lb 9.6 oz)   BMI 40.84 kg/m    Body mass index is 40.84 kg/m .  Physical Exam   GENERAL: healthy, alert and no distress  NECK: no adenopathy, no asymmetry, masses, or scars and thyroid normal to palpation  RESP: lungs clear to auscultation - no rales, rhonchi or wheezes  CV: regular rate and rhythm, normal S1 S2, no S3 or S4, no murmur, click or rub, no peripheral edema and peripheral pulses strong  ABDOMEN: soft, nontender, no hepatosplenomegaly, no masses and bowel sounds normal  MS: no gross musculoskeletal defects noted, no edema  PSYCH: mentation appears normal, affect normal/bright    Office Visit on 02/02/2022   Component Date Value Ref Range Status     Carbon Dioxide 02/02/2022 30.5  20 - 32 mmol/L Final     Creatinine 02/02/2022 0.90  0.60 - 1.30 mg/dL Final     Glucose 02/02/2022 117 (A)  60 - 99 mg/dL Final     Sodium 02/02/2022 140.5  135 - 146 mmol/L Final     Potassium 02/02/2022 4.57  3.5 - 5.3 mmol/L Final     Chloride 02/02/2022 101.4  98 - 110 mmol/L Final     Protein Total 02/02/2022 7.4  6.1 - 8.1 g/dL Final     Albumin 02/02/2022 4.5  3.6 - 5.1 g/dL Final     Alkaline Phosphatase 02/02/2022 44  33 - 130 U/L Final     ALT 02/02/2022 75 (A)  0 - 32 U/L Final     AST 02/02/2022 42 (A)  0 - 35 U/L Final     Bilirubin Total 02/02/2022 0.7  0.2 - 1.2 mg/dL Final     Urea Nitrogen 02/02/2022 18  7 - 25 mg/dL Final     Calcium 02/02/2022 9.7  8.6 - 10.3 mg/dL Final     BUN/Creatinine " Ratio 02/02/2022 20.0  6 - 22 Final     Globulin Calculated 02/02/2022 2.9  1.9 - 3.7 Final     A/G Ratio 02/02/2022 1.6  1 - 2.5 Final     Cholesterol 02/02/2022 187  0 - 199 mg/dL Final     Triglycerides 02/02/2022 139  0 - 149 mg/dL Final     HDL Cholesterol 02/02/2022 47  40 - 150 mg/dL Final     LDL Cholesterol Direct 02/02/2022 112  0 - 130 mg/dL Final     Cholesterol/HDL Ratio 02/02/2022 4  0 - 5 Final     Abbott PSA 02/02/2022 2.26  < OR = 4.00 ng/mL Final    Comment: The total PSA value from this assay system is   standardized against the WHO standard. The test   result will be approximately 20% lower when compared   to the equimolar-standardized total PSA (Essie   [place]). Comparison of serial PSA results should be   interpreted with this fact in mind.     This test was performed using the Siemens   chemiluminescent method. Values obtained from   different assay methods cannot be used  interchangeably. PSA levels, regardless of  value, should not be interpreted as absolute  evidence of the presence or absence of disease.       HCV Antibody 02/02/2022 NON-REACTIVE  NON-REACTIVE Final     SIGNAL TO CUT OFF - QUEST 02/02/2022 0.01  <1.00 Final    Comment:    HCV antibody was non-reactive. There is no laboratory   evidence of HCV infection.     In most cases, no further action is required. However,  if recent HCV exposure is suspected, a test for HCV RNA  (test code 84701) is suggested.     For additional information please refer to  http://education.[employer].Room 77/faq/ZZN40o9  (This link is being provided for informational/  educational purposes only.)

## 2022-12-14 LAB
BUN SERPL-MCNC: 17 MG/DL (ref 7–25)
BUN/CREATININE RATIO: 18.7 (ref 6–22)
CALCIUM SERPL-MCNC: 9.7 MG/DL (ref 8.6–10.3)
CHLORIDE SERPLBLD-SCNC: 100 MMOL/L (ref 98–110)
CO2 SERPL-SCNC: 33.2 MMOL/L (ref 20–32)
CREAT SERPL-MCNC: 0.91 MG/DL (ref 0.6–1.3)
GLUCOSE SERPL-MCNC: 135 MG/DL (ref 60–99)
POTASSIUM SERPL-SCNC: 4.79 MMOL/L (ref 3.5–5.3)
SODIUM SERPL-SCNC: 138.2 MMOL/L (ref 135–146)

## 2023-04-16 ENCOUNTER — HEALTH MAINTENANCE LETTER (OUTPATIENT)
Age: 63
End: 2023-04-16

## 2023-06-07 DIAGNOSIS — I10 ESSENTIAL HYPERTENSION: ICD-10-CM

## 2023-06-07 RX ORDER — AMLODIPINE BESYLATE 10 MG/1
10 TABLET ORAL DAILY
Qty: 30 TABLET | Refills: 0 | Status: SHIPPED | OUTPATIENT
Start: 2023-06-07 | End: 2023-07-03

## 2023-06-07 NOTE — TELEPHONE ENCOUNTER
Pt has appt scheduled for 07/03. He needs a refill of his amlodipine only.    Mayur Celeste is requesting a refill of:    Pending Prescriptions:                       Disp   Refills    amLODIPine (NORVASC) 10 MG tablet         30 tab*0            Sig: Take 1 tablet (10 mg) by mouth daily

## 2023-07-03 ENCOUNTER — MYC MEDICAL ADVICE (OUTPATIENT)
Dept: FAMILY MEDICINE | Facility: CLINIC | Age: 63
End: 2023-07-03

## 2023-07-03 ENCOUNTER — OFFICE VISIT (OUTPATIENT)
Dept: FAMILY MEDICINE | Facility: CLINIC | Age: 63
End: 2023-07-03

## 2023-07-03 VITALS
OXYGEN SATURATION: 97 % | BODY MASS INDEX: 39.65 KG/M2 | TEMPERATURE: 97.8 F | WEIGHT: 277 LBS | HEIGHT: 70 IN | DIASTOLIC BLOOD PRESSURE: 82 MMHG | SYSTOLIC BLOOD PRESSURE: 128 MMHG | HEART RATE: 58 BPM

## 2023-07-03 DIAGNOSIS — I10 ESSENTIAL HYPERTENSION: ICD-10-CM

## 2023-07-03 DIAGNOSIS — G47.33 OSA (OBSTRUCTIVE SLEEP APNEA): ICD-10-CM

## 2023-07-03 DIAGNOSIS — R73.09 ELEVATED GLUCOSE: ICD-10-CM

## 2023-07-03 DIAGNOSIS — E66.01 MORBID OBESITY (H): ICD-10-CM

## 2023-07-03 DIAGNOSIS — R94.5 ABNORMAL RESULTS OF LIVER FUNCTION STUDIES: ICD-10-CM

## 2023-07-03 DIAGNOSIS — Z00.00 ROUTINE HISTORY AND PHYSICAL EXAMINATION OF ADULT: Primary | ICD-10-CM

## 2023-07-03 DIAGNOSIS — Z80.42 FH: MALIGNANT NEOPLASM OF PROSTATE: ICD-10-CM

## 2023-07-03 DIAGNOSIS — N52.9 ERECTILE DYSFUNCTION, UNSPECIFIED ERECTILE DYSFUNCTION TYPE: ICD-10-CM

## 2023-07-03 LAB
ALBUMIN SERPL-MCNC: 4.4 G/DL (ref 3.6–5.1)
ALBUMIN/GLOB SERPL: 1.4 {RATIO} (ref 1–2.5)
ALP SERPL-CCNC: 50 U/L (ref 33–130)
ALT 1742-6: 172 U/L (ref 0–32)
AST 1920-8: 86 U/L (ref 0–35)
BILIRUB SERPL-MCNC: 0.8 MG/DL (ref 0.2–1.2)
BUN SERPL-MCNC: 26 MG/DL (ref 7–25)
BUN/CREATININE RATIO: 28.6 (ref 6–32)
CALCIUM SERPL-MCNC: 9.7 MG/DL (ref 8.6–10.3)
CHLORIDE SERPLBLD-SCNC: 102.1 MMOL/L (ref 98–110)
CHOLEST SERPL-MCNC: 194 MG/DL (ref 0–199)
CHOLEST/HDLC SERPL: 4 {RATIO} (ref 0–5)
CO2 SERPL-SCNC: 30.2 MMOL/L (ref 20–32)
CREAT SERPL-MCNC: 0.91 MG/DL (ref 0.6–1.3)
GLOBULIN, CALCULATED - QUEST: 3.1 (ref 1.9–3.7)
GLUCOSE SERPL-MCNC: 162 MG/DL (ref 60–99)
HDLC SERPL-MCNC: 50 MG/DL (ref 40–150)
LDLC SERPL CALC-MCNC: 120 MG/DL (ref 0–130)
POTASSIUM SERPL-SCNC: 4.28 MMOL/L (ref 3.5–5.3)
PROT SERPL-MCNC: 7.5 G/DL (ref 6.1–8.1)
SODIUM SERPL-SCNC: 138.4 MMOL/L (ref 135–146)
TRIGL SERPL-MCNC: 119 MG/DL (ref 0–149)

## 2023-07-03 PROCEDURE — 36415 COLL VENOUS BLD VENIPUNCTURE: CPT | Performed by: FAMILY MEDICINE

## 2023-07-03 PROCEDURE — 80061 LIPID PANEL: CPT | Performed by: FAMILY MEDICINE

## 2023-07-03 PROCEDURE — 99396 PREV VISIT EST AGE 40-64: CPT | Performed by: FAMILY MEDICINE

## 2023-07-03 PROCEDURE — 80053 COMPREHEN METABOLIC PANEL: CPT | Performed by: FAMILY MEDICINE

## 2023-07-03 PROCEDURE — 84153 ASSAY OF PSA TOTAL: CPT | Mod: 90 | Performed by: FAMILY MEDICINE

## 2023-07-03 PROCEDURE — 83036 HEMOGLOBIN GLYCOSYLATED A1C: CPT | Performed by: FAMILY MEDICINE

## 2023-07-03 RX ORDER — LISINOPRIL AND HYDROCHLOROTHIAZIDE 20; 25 MG/1; MG/1
1 TABLET ORAL DAILY
Qty: 90 TABLET | Refills: 1 | Status: SHIPPED | OUTPATIENT
Start: 2023-07-03 | End: 2024-02-07

## 2023-07-03 RX ORDER — SILDENAFIL 100 MG/1
100 TABLET, FILM COATED ORAL DAILY PRN
Qty: 30 TABLET | Refills: 1 | Status: CANCELLED | OUTPATIENT
Start: 2023-07-03

## 2023-07-03 RX ORDER — AMLODIPINE BESYLATE 10 MG/1
10 TABLET ORAL DAILY
Qty: 90 TABLET | Refills: 1 | Status: SHIPPED | OUTPATIENT
Start: 2023-07-03 | End: 2024-01-16

## 2023-07-03 NOTE — PROGRESS NOTES
3  SUBJECTIVE:   CC: Mayur Celeste is an 63 year old male who presents for preventive health visit.       Patient has been advised of split billing requirements and indicates understanding: Yes  Healthy Habits:    Do you get at least three servings of calcium containing foods daily (dairy, green leafy vegetables, etc.)? yes    Amount of exercise or daily activities, outside of work: 6 day(s) per week    Problems taking medications regularly No    Medication side effects: No    Have you had an eye exam in the past two years? yes    Do you see a dentist twice per year? yes    Do you have sleep apnea, excessive snoring or daytime drowsiness?yes          Today's PHQ-2 Score:     7/3/2023     8:41 AM 2/2/2022     8:35 AM   PHQ-2 ( 1999 Pfizer)   Q1: Little interest or pleasure in doing things 0 0   Q2: Feeling down, depressed or hopeless 0 0   PHQ-2 Score 0 0       Abuse: Current or Past(Physical, Sexual or Emotional)- No  Do you feel safe in your environment? Yes        Social History     Tobacco Use     Smoking status: Never     Passive exposure: Never     Smokeless tobacco: Never   Substance Use Topics     Alcohol use: Yes     Alcohol/week: 4.0 standard drinks of alcohol     Types: 4 Standard drinks or equivalent per week     If you drink alcohol do you typically have >3 drinks per day or >7 drinks per week? No                      Last PSA:   Abbott PSA   Date Value Ref Range Status   02/02/2022 2.26 < OR = 4.00 ng/mL Final     Comment:     The total PSA value from this assay system is   standardized against the WHO standard. The test   result will be approximately 20% lower when compared   to the equimolar-standardized total PSA (Essie   Naper). Comparison of serial PSA results should be   interpreted with this fact in mind.     This test was performed using the Siemens   chemiluminescent method. Values obtained from   different assay methods cannot be used  interchangeably. PSA levels, regardless of  value,  should not be interpreted as absolute  evidence of the presence or absence of disease.         Reviewed orders with patient. Reviewed health maintenance and updated orders accordingly - Yes  BP Readings from Last 3 Encounters:   07/03/23 128/82   12/13/22 (!) 146/86   09/14/22 (!) 147/98    Wt Readings from Last 3 Encounters:   07/03/23 125.6 kg (277 lb)   12/13/22 129.1 kg (284 lb 9.6 oz)   09/14/22 127.9 kg (282 lb)                  Patient Active Problem List   Diagnosis     Status post THR (total hip replacement)     Osteoarthritis     Health Care Home     Essential hypertension, benign     Family history of prostate cancer     Morbid obesity (H)     Past Surgical History:   Procedure Laterality Date     ARTHROPLASTY HIP      left     ARTHROPLASTY HIP  12/21/2011    Procedure:ARTHROPLASTY HIP; Right Total Hip Arthroplasty, Right Hip Hardware Removal     ; Surgeon:ISAAC RODRIGUEZ; Location:[facility] OR     ORTHOPEDIC SURGERY      right hip pinning       Social History     Tobacco Use     Smoking status: Never     Passive exposure: Never     Smokeless tobacco: Never   Substance Use Topics     Alcohol use: Yes     Alcohol/week: 4.0 standard drinks of alcohol     Types: 4 Standard drinks or equivalent per week     Family History   Problem Relation Age of Onset     Prostate Cancer Father      Coronary Artery Disease Father      Colon Cancer Other      Cerebrovascular Disease Other          Current Outpatient Medications   Medication Sig Dispense Refill     amLODIPine (NORVASC) 10 MG tablet Take 1 tablet (10 mg) by mouth daily 90 tablet 1     lisinopril-hydrochlorothiazide (ZESTORETIC) 20-25 MG tablet Take 1 tablet by mouth daily TK 1 T PO D 90 tablet 1     naproxen sodium (ANAPROX) 220 MG tablet Take 220 mg by mouth 2 times daily (with meals).       sildenafil (VIAGRA) 100 MG tablet Take 1 tablet (100 mg) by mouth daily as needed 30 tablet 1     No Known Allergies  Recent Labs   Lab Test 12/13/22  0000 02/02/22  0000  "12/29/20  1210 12/29/20  1209   LDL  --  112  --  129   HDL  --  47  --  52   TRIG  --  139  --  142   CR 0.91 0.90   < >  --    POTASSIUM 4.79 4.57   < >  --     < > = values in this interval not displayed.        Reviewed and updated as needed this visit by clinical staff   Tobacco  Allergies   Problems  Med Hx  Surg Hx  Fam Hx          Reviewed and updated as needed this visit by Provider   Tobacco     Med Hx  Surg Hx  Fam Hx         Past Medical History:   Diagnosis Date     Chronic pain     back pain     Hypertension      PONV (postoperative nausea and vomiting)       Past Surgical History:   Procedure Laterality Date     ARTHROPLASTY HIP      left     ARTHROPLASTY HIP  12/21/2011    Procedure:ARTHROPLASTY HIP; Right Total Hip Arthroplasty, Right Hip Hardware Removal     ; Surgeon:ISAAC RODRIGUEZ; Location:[facility] OR     ORTHOPEDIC SURGERY      right hip pinning       ROS:  CONSTITUTIONAL: NEGATIVE for fever, chills, change in weight  INTEGUMENTARY/SKIN: NEGATIVE for worrisome rashes, moles or lesions  EYES: NEGATIVE for vision changes or irritation  ENT: NEGATIVE for ear, mouth and throat problems  RESP: NEGATIVE for significant cough or SOB  CV: NEGATIVE for chest pain, palpitations or peripheral edema  GI: NEGATIVE for nausea, abdominal pain, heartburn, or change in bowel habits   male: negative for dysuria, hematuria, decreased urinary stream, erectile dysfunction, urethral discharge  MUSCULOSKELETAL: NEGATIVE for significant arthralgias or myalgia  NEURO: NEGATIVE for weakness, dizziness or paresthesias  ENDOCRINE: NEGATIVE for temperature intolerance, skin/hair changes  PSYCHIATRIC: NEGATIVE for changes in mood or affect    OBJECTIVE:   /82 (BP Location: Right arm, Patient Position: Sitting, Cuff Size: Adult Large)   Pulse 58   Temp 97.8  F (36.6  C) (Temporal)   Ht 1.778 m (5' 10\")   Wt 125.6 kg (277 lb)   SpO2 97%   BMI 39.75 kg/m    EXAM:  GENERAL: healthy, alert and no " distress  EYES: Eyes grossly normal to inspection, PERRL and conjunctivae and sclerae normal  HENT: ear canals and TM's normal, nose and mouth without ulcers or lesions  NECK: no adenopathy, no asymmetry, masses, or scars and thyroid normal to palpation  RESP: lungs clear to auscultation - no rales, rhonchi or wheezes  CV: regular rate and rhythm, normal S1 S2, no S3 or S4, no murmur, click or rub, no peripheral edema and peripheral pulses strong  ABDOMEN: soft, nontender, no hepatosplenomegaly, no masses and bowel sounds normal   (male): normal male genitalia without lesions or urethral discharge, no hernia  MS: no gross musculoskeletal defects noted, no edema  SKIN: no suspicious lesions or rashes  NEURO: Normal strength and tone, mentation intact and speech normal  PSYCH: mentation appears normal, affect normal/bright    Diagnostic Test Results:  Labs reviewed in Epic    ASSESSMENT/PLAN:   (Z00.00) Routine history and physical examination of adult  (primary encounter diagnosis)  Comment: discussed preventitive healthcare   Plan: Continue to work on healthy diet and exercise, discussed healthy habits     (I10) Essential hypertension  Comment: well controlled overall, some am highs with coffee and before meds but better rest of day  Plan: amLODIPine (NORVASC) 10 MG tablet,         lisinopril-hydrochlorothiazide (ZESTORETIC)         20-25 MG tablet, Lipid Panel (BFP),         Comprehensive Metobolic Panel (BFP), VENOUS         COLLECTION        continue current medications at current doses     (N52.9) Erectile dysfunction, unspecified erectile dysfunction type  Comment: well controlled  Plan:     (G47.33) ILEANA (obstructive sleep apnea)  Comment: cpap  Plan:     (E66.01) Morbid obesity (H)  Comment: Continue to work on healthy diet and exercise, discussed healthy habits   Plan:     (Z80.42) FH: malignant neoplasm of prostate  Comment:   Plan: PSA Total ([facility]), VENOUS COLLECTION            (R94.5) Abnormal results  "of liver function studies  Comment: recheck, if rising needs US, likely fatty liver  Plan: Comprehensive Metobolic Panel (BFP), VENOUS         COLLECTION              Patient has been advised of split billing requirements and indicates understanding: Yes  COUNSELING:  Reviewed preventive health counseling, as reflected in patient instructions       Regular exercise       Healthy diet/nutrition       Vision screening       Colorectal cancer screening       Prostate cancer screening    Estimated body mass index is 39.75 kg/m  as calculated from the following:    Height as of this encounter: 1.778 m (5' 10\").    Weight as of this encounter: 125.6 kg (277 lb).    Weight management plan: Discussed healthy diet and exercise guidelines    He reports that he has never smoked. He has never been exposed to tobacco smoke. He has never used smokeless tobacco.      Counseling Resources:  ATP IV Guidelines  Pooled Cohorts Equation Calculator  FRAX Risk Assessment  ICSI Preventive Guidelines  Dietary Guidelines for Americans, 2010  USDA's MyPlate  ASA Prophylaxis  Lung CA Screening    Rich Dumont MD  [facility]  "

## 2023-07-03 NOTE — NURSING NOTE
Chief Complaint   Patient presents with     Physical     Fasting today      Recheck Medication     Refill medications     Pre-visit Screening:  Immunizations:  up to date  Colonoscopy:  is up to date  Mammogram: NA  Asthma Action Test/Plan:  NA  PHQ9:  NA  GAD7:  NA  Questioned patient about current smoking habits Pt. has never smoked.  Ok to leave detailed message on voice mail for today's visit only Yes, phone # [phone]

## 2023-07-04 LAB — ABBOTT PSA - QUEST: 1.9 NG/ML

## 2023-07-05 PROBLEM — E11.9 TYPE 2 DIABETES MELLITUS WITHOUT COMPLICATION, WITHOUT LONG-TERM CURRENT USE OF INSULIN (H): Status: ACTIVE | Noted: 2023-07-05

## 2023-07-05 LAB — HBA1C MFR BLD: 7.5 % (ref 4–7)

## 2023-07-10 ENCOUNTER — OFFICE VISIT (OUTPATIENT)
Dept: FAMILY MEDICINE | Facility: CLINIC | Age: 63
End: 2023-07-10

## 2023-07-10 VITALS
HEART RATE: 77 BPM | OXYGEN SATURATION: 97 % | WEIGHT: 277 LBS | HEIGHT: 70 IN | DIASTOLIC BLOOD PRESSURE: 82 MMHG | TEMPERATURE: 98 F | BODY MASS INDEX: 39.65 KG/M2 | SYSTOLIC BLOOD PRESSURE: 124 MMHG

## 2023-07-10 DIAGNOSIS — Z79.4 TYPE 2 DIABETES MELLITUS WITHOUT COMPLICATION, WITH LONG-TERM CURRENT USE OF INSULIN (H): Primary | ICD-10-CM

## 2023-07-10 DIAGNOSIS — E11.9 TYPE 2 DIABETES MELLITUS WITHOUT COMPLICATION, WITH LONG-TERM CURRENT USE OF INSULIN (H): Primary | ICD-10-CM

## 2023-07-10 DIAGNOSIS — I10 ESSENTIAL HYPERTENSION: ICD-10-CM

## 2023-07-10 DIAGNOSIS — R94.5 ABNORMAL RESULTS OF LIVER FUNCTION STUDIES: ICD-10-CM

## 2023-07-10 DIAGNOSIS — E78.5 HYPERLIPIDEMIA LDL GOAL <100: ICD-10-CM

## 2023-07-10 DIAGNOSIS — E66.01 MORBID OBESITY (H): ICD-10-CM

## 2023-07-10 LAB
ALBUMIN (URINE) MG/L: 10
ALBUMIN URINE MG/G CR: <30 MG/G CREATININE
CREATININE URINE MG/DL: 100 MG/DL

## 2023-07-10 PROCEDURE — 82043 UR ALBUMIN QUANTITATIVE: CPT | Performed by: FAMILY MEDICINE

## 2023-07-10 PROCEDURE — 82570 ASSAY OF URINE CREATININE: CPT | Performed by: FAMILY MEDICINE

## 2023-07-10 PROCEDURE — 99214 OFFICE O/P EST MOD 30 MIN: CPT | Performed by: FAMILY MEDICINE

## 2023-07-10 NOTE — NURSING NOTE
Chief Complaint   Patient presents with     Abnormal Labs     Discuss recent abnormal labs, new diabetes diagnosis     Pre-visit Screening:  Immunizations:  up to date  Colonoscopy:  is up to date  Mammogram: NA  Asthma Action Test/Plan:  NA  PHQ9:  NA  GAD7:  NA  Questioned patient about current smoking habits Pt. has never smoked.  Ok to leave detailed message on voice mail for today's visit only Yes, phone # [phone]

## 2023-07-10 NOTE — PROGRESS NOTES
Assessment & Plan     Type 2 diabetes mellitus without complication, with long-term current use of insulin (H)  New diagnosis, discussed in some detail, issues with diabetes, affects on various body systems    Discussed guidelines for statin, ASA, meds    Pt is very interested in medication to assist with weight loss as well as diabetes    We discussed blood sugar monitorin    Recommend he meet with MTM for diabetes education and med options, discussed metformin as initial option    Will hold until MTM eval    Check microalbumin, already on ACE-I  - ALBUMIN RANDOM URINE QUANTITATIVE (BFP)  - FOOT EXAM  NO CHARGE [74150.114]    Hyperlipidemia LDL goal <100  As noted, statin would be recommended but need to check liver first given elevated LFTS    US scheduled, suspect fatty liver    Essential hypertension  Well controlled, continue current medications at current doses     Morbid obesity (H)  Continue to work on healthy diet and exercise, discussed healthy habits     Abnormal results of liver function studies  Likely fatty liver, will check US  - US Abdomen Limited  - Radiology Referral      Review of the result(s) of each unique test - labs  Ordering of each unique test         FUTURE APPOINTMENTS:       - Follow-up visit MT ASAP  Work on weight loss  Regular exercise    No follow-ups on file.    Rich Dumont MD  [facility]    Ashley Rodriguez is a 63 year old, presenting for the following health issues:  Abnormal Labs (Discuss recent abnormal labs, new diabetes diagnosis)    HPI     Diabetes Follow-up-new diagnosis      How often are you checking your blood sugar? Not at all    What concerns do you have today about your diabetes? None     Do you have any of these symptoms? (Select all that apply)      Have you had a diabetic eye exam in the last 12 months? yes            Hyperlipidemia Follow-Up      Are you regularly taking any medication or supplement to lower your cholesterol?    "No    Are you having muscle aches or other side effects that you think could be caused by your cholesterol lowering medication?  No    Hypertension Follow-up      Do you check your blood pressure regularly outside of the clinic? Yes     Are you following a low salt diet? No    Are your blood pressures ever more than 140 on the top number (systolic) OR more   than 90 on the bottom number (diastolic), for example 140/90? No    BP Readings from Last 2 Encounters:   07/10/23 124/82   07/03/23 128/82     Hemoglobin A1C (%)   Date Value   07/05/2023 7.5 (A)     LDL Cholesterol Direct (mg/dL)   Date Value   07/03/2023 120   02/02/2022 112             Pt liver tests also up- noted last labs      Review of Systems   Constitutional, HEENT, cardiovascular, pulmonary, gi and gu systems are negative, except as otherwise noted.      Objective    /82 (BP Location: Right arm, Patient Position: Sitting, Cuff Size: Adult Large)   Pulse 77   Temp 98  F (36.7  C) (Temporal)   Ht 1.778 m (5' 10\")   Wt 125.6 kg (277 lb)   SpO2 97%   BMI 39.75 kg/m    Body mass index is 39.75 kg/m .  Physical Exam   GENERAL: healthy, alert and no distress  RESP: lungs clear to auscultation - no rales, rhonchi or wheezes  CV: regular rate and rhythm, normal S1 S2, no S3 or S4, no murmur, click or rub, no peripheral edema and peripheral pulses strong  MS: no gross musculoskeletal defects noted, no edema    Office Visit on 07/03/2023   Component Date Value Ref Range Status     Cholesterol 07/03/2023 194  0 - 199 mg/dL Final     Triglycerides 07/03/2023 119  0 - 149 mg/dL Final     HDL Cholesterol 07/03/2023 50  40 - 150 mg/dL Final     LDL Cholesterol Direct 07/03/2023 120  0 - 130 mg/dL Final     Cholesterol/HDL Ratio 07/03/2023 4  0 - 5 Final     Carbon Dioxide 07/03/2023 30.2  20 - 32 mmol/L Final     Creatinine 07/03/2023 0.91  0.60 - 1.30 mg/dL Final     Glucose 07/03/2023 162 (A)  60 - 99 mg/dL Final     Sodium 07/03/2023 138.4  135 - 146 " mmol/L Final     Potassium 07/03/2023 4.28  3.5 - 5.3 mmol/L Final     Chloride 07/03/2023 102.1  98 - 110 mmol/L Final     Protein Total 07/03/2023 7.5  6.1 - 8.1 g/dL Final     Albumin 07/03/2023 4.4  3.6 - 5.1 g/dL Final     Alkaline Phosphatase 07/03/2023 50  33 - 130 U/L Final     ALT 07/03/2023 172 (A)  0 - 32 U/L Final     AST 07/03/2023 86 (A)  0 - 35 U/L Final     Bilirubin Total 07/03/2023 0.8  0.2 - 1.2 mg/dL Final     Urea Nitrogen 07/03/2023 26 (A)  7 - 25 mg/dL Final     Calcium 07/03/2023 9.7  8.6 - 10.3 mg/dL Final     BUN/Creatinine Ratio 07/03/2023 28.6  6 - 32 Final     Globulin Calculated 07/03/2023 3.1  1.9 - 3.7 Final     A/G Ratio 07/03/2023 1.4  1 - 2.5 Final     Abbott PSA 07/03/2023 1.90  < OR = 4.00 ng/mL Final    Comment: The total PSA value from this assay system is   standardized against the WHO standard. The test   result will be approximately 20% lower when compared   to the equimolar-standardized total PSA (Essie   Kittrell). Comparison of serial PSA results should be   interpreted with this fact in mind.     This test was performed using the Siemens   chemiluminescent method. Values obtained from   different assay methods cannot be used  interchangeably. PSA levels, regardless of  value, should not be interpreted as absolute  evidence of the presence or absence of disease.       Hemoglobin A1C 07/05/2023 7.5 (A)  4.0 - 7.0 % Final

## 2023-07-13 ENCOUNTER — OFFICE VISIT (OUTPATIENT)
Dept: FAMILY MEDICINE | Facility: CLINIC | Age: 63
End: 2023-07-13

## 2023-07-13 DIAGNOSIS — E11.9 TYPE 2 DIABETES MELLITUS WITHOUT COMPLICATION, WITH LONG-TERM CURRENT USE OF INSULIN (H): Primary | ICD-10-CM

## 2023-07-13 DIAGNOSIS — Z79.4 TYPE 2 DIABETES MELLITUS WITHOUT COMPLICATION, WITH LONG-TERM CURRENT USE OF INSULIN (H): Primary | ICD-10-CM

## 2023-07-13 NOTE — PROGRESS NOTES
SUBJECTIVE / OBJECTIVE:                                                Mayur Celeste is a 63 year old male seen for an initial visit for Medication Therapy Management.  He was referred to me by Dr. Rich Dumont.     REASON FOR MTM REFERRAL: medication management services     PATIENT CHIEF COMPLAINT/CONCERN: New diabetes diagnosis    PAST MEDICAL HISTORY: Reviewed in chart    MEDICAL CONDITIONS REVIEWED:    diabetes mellitus- type 2      Current Outpatient Medications   Medication Sig Dispense Refill     semaglutide (OZEMPIC) 2 MG/3ML pen Inject 0.25 mg Subcutaneous every 7 days Increase to 0.5mg weekly subcutaneously every 7 days 3 mL 1     amLODIPine (NORVASC) 10 MG tablet Take 1 tablet (10 mg) by mouth daily 90 tablet 1     lisinopril-hydrochlorothiazide (ZESTORETIC) 20-25 MG tablet Take 1 tablet by mouth daily TK 1 T PO D 90 tablet 1     naproxen sodium (ANAPROX) 220 MG tablet Take 220 mg by mouth 2 times daily (with meals).       sildenafil (VIAGRA) 100 MG tablet Take 1 tablet (100 mg) by mouth daily as needed 30 tablet 1       Current labs include:  BP Readings from Last 3 Encounters:   07/10/23 124/82   07/03/23 128/82   12/13/22 (!) 146/86       There were no vitals taken for this visit.    Most Recent Immunizations   Administered Date(s) Administered     COVID-19 Bivalent 18+ (Moderna) 10/27/2022     COVID-19 Monovalent 18+ (Moderna) 04/04/2022     Flu, Unspecified 12/11/2018     [id number] Novel Flu 11/05/2009     Influenza (IIV3) PF 10/02/2021     Influenza Vaccine >6 months (Alfuria,Fluzone) 09/27/2016     Influenza,INJ,MDCK,PF,Quad >6mo(Flucelvax) 10/27/2022     TD,PF 7+ (Tenivac) 05/01/2018     TDAP (Adacel,Boostrix) 07/27/2007     Td (Adult), Adsorbed 04/21/2000     Zoster recombinant adjuvanted (SHINGRIX) 05/20/2020       ASSESSMENT / PLAN:                                                        Diabetes:  Assessment: Patient presents today with new onset of diabetes. Discussed disease  state and importance of managing with patient.     Discussed lifestyle modifications including diet and exercise. Discussed carbohydrate and sugar effect on BG.       Discussed medications available with patient. Patient's ASCVD risk score puts at patient at high risk. With this risk, I would recommend patient start with GLP-1 RA.    The 10-year ASCVD risk score (Michelle YOUNGER, et al., 2019) is: 21.5%    Values used to calculate the score:      Age: 63 years      Sex: Male      Is Non- : No      Diabetic: Yes      Tobacco smoker: No      Systolic Blood Pressure: 124 mmHg      Is BP treated: Yes      HDL Cholesterol: 50 mg/dL      Total Cholesterol: 194 mg/dL        Patient agrees with starting Ozempic. Will work with patient on taper. Initiate at 0.25mg weekly. Demonstrated technique with patient in clinic.     Will follow up with patient in 4 weeks to determine if taper is appropriate/needed.        Status: Patient newly diagnosed with diabetes.     Drug Therapy Problems:  1) Patient is an appropriate candidate for GLP-1 RA.     Plan:  1) Initate Ozempic at 0.25mg weekly.   2) Follow up in 4 week via phone to determine if taper is needed.      I spent 45 minutes with this patient today.  All changes were made via collaborative practice agreement with Rich Dumont. A copy of the visit note was provided to the patient's primary care provider.    Jessie Cotter, Tadeo  Medication Therapy Management Pharmacist  [facility]  Office Phone: [phone]

## 2023-07-14 ENCOUNTER — MYC MEDICAL ADVICE (OUTPATIENT)
Dept: FAMILY MEDICINE | Facility: CLINIC | Age: 63
End: 2023-07-14

## 2023-07-14 DIAGNOSIS — E11.9 TYPE 2 DIABETES MELLITUS WITHOUT COMPLICATION, WITH LONG-TERM CURRENT USE OF INSULIN (H): ICD-10-CM

## 2023-07-14 DIAGNOSIS — Z79.4 TYPE 2 DIABETES MELLITUS WITHOUT COMPLICATION, WITH LONG-TERM CURRENT USE OF INSULIN (H): ICD-10-CM

## 2023-07-14 NOTE — TELEPHONE ENCOUNTER
Dev (pharmacist) at [facility] left a voicemail wanting to know a start date of when pt is to begin increasing Ozempic?    Perhaps send a new script with dates?    Thanks, Caryn      Pending Prescriptions:                       Disp   Refills    semaglutide (OZEMPIC) 2 MG/3ML pen        3 mL   1            Sig: Inject 0.25 mg Subcutaneous every 7 days Increase           to 0.5mg weekly subcutaneously every 7 days

## 2023-08-10 ENCOUNTER — TELEPHONE (OUTPATIENT)
Dept: FAMILY MEDICINE | Facility: CLINIC | Age: 63
End: 2023-08-10

## 2023-08-10 NOTE — TELEPHONE ENCOUNTER
ISSUE:   Tacrolimus IR level <3.0 on 1/25, goal 4-6, dose 1.5 mg BID.    PLAN:   Please call patient and confirm this was an accurate 12-hour trough. Verify Tacrolimus IR dose 1.5 mg BID. Confirm no new medications or illness. Confirm no missed doses. If accurate trough and accurate dose, increase Tacrolimus IR dose to 2 mg BID and repeat labs in 1 weeks    OUTCOME:   Call placed to pt. LVM regarding dose increase. Requested return call to tx office.      Patient follow-up after starting Ozempic. Patient has had no side effects and limited to no appetite control. He determined after 3 weeks to increase to 0.5mg. He has done well at this dose as well. I did recommend that he complete the full four weeks of 0.5mg before tapering to 1.0mg. Discussed with patient that he will want to follow up via telephone with myself prior to increase. He also will be due to follow up in clinic in early October which he is aware of.    Jessie Cotter, PharmD  Clinical Pharmacist  [facility]  General Clinic Phone: [phone]  Direct Office Phone: [phone]

## 2023-09-04 ENCOUNTER — MYC MEDICAL ADVICE (OUTPATIENT)
Dept: FAMILY MEDICINE | Facility: CLINIC | Age: 63
End: 2023-09-04

## 2023-09-04 DIAGNOSIS — Z79.4 TYPE 2 DIABETES MELLITUS WITHOUT COMPLICATION, WITH LONG-TERM CURRENT USE OF INSULIN (H): Primary | ICD-10-CM

## 2023-09-04 DIAGNOSIS — E11.9 TYPE 2 DIABETES MELLITUS WITHOUT COMPLICATION, WITH LONG-TERM CURRENT USE OF INSULIN (H): Primary | ICD-10-CM

## 2023-09-05 NOTE — TELEPHONE ENCOUNTER
Patient doing well on 0.5mg dose of Ozempic. No notable side effects. Will increase to 1.0mg weekly at this time. Patient due for follow up beginning of October.    Jessie Cotter, PharmD  Clinical Pharmacist  [facility]  General [facility] Phone: [phone]  Direct Office Phone: [phone]

## 2023-09-25 ENCOUNTER — MYC MEDICAL ADVICE (OUTPATIENT)
Dept: FAMILY MEDICINE | Facility: CLINIC | Age: 63
End: 2023-09-25

## 2023-09-25 DIAGNOSIS — Z79.4 TYPE 2 DIABETES MELLITUS WITHOUT COMPLICATION, WITH LONG-TERM CURRENT USE OF INSULIN (H): ICD-10-CM

## 2023-09-25 DIAGNOSIS — E11.9 TYPE 2 DIABETES MELLITUS WITHOUT COMPLICATION, WITH LONG-TERM CURRENT USE OF INSULIN (H): ICD-10-CM

## 2023-09-25 RX ORDER — SEMAGLUTIDE 1.34 MG/ML
INJECTION, SOLUTION SUBCUTANEOUS
Qty: 3 ML | Refills: 0 | COMMUNITY
Start: 2023-09-25

## 2023-09-25 NOTE — TELEPHONE ENCOUNTER
Mayur Celeste is requesting a refill of:    Refused Prescriptions:                       Disp   Refills    Semaglutide, 1 MG/DOSE, (OZEMPIC, 1 MG/DOS*3 mL   0        Sig: INJECT 1MG SUBCUTANEOUSLY EVERY 7 DAYS  Refused By: BJORN BALLARD  Reason for Refusal: Patient needs appointment

## 2023-09-26 NOTE — TELEPHONE ENCOUNTER
Mayur Celeste is requesting a refill of:    Pending Prescriptions:                       Disp   Refills    Semaglutide (1 MG/DOSE) (OZEMPIC) 4 MG/3M*3 mL   0            Sig: Inject 1 mg Subcutaneous every 7 days    Please close encounter if RX was sent. Thanks, Renetta  Pt has OV on 10/1/23

## 2023-10-09 ENCOUNTER — OFFICE VISIT (OUTPATIENT)
Dept: FAMILY MEDICINE | Facility: CLINIC | Age: 63
End: 2023-10-09

## 2023-10-09 VITALS
WEIGHT: 273 LBS | SYSTOLIC BLOOD PRESSURE: 132 MMHG | TEMPERATURE: 97.7 F | HEIGHT: 70 IN | DIASTOLIC BLOOD PRESSURE: 82 MMHG | BODY MASS INDEX: 39.08 KG/M2 | HEART RATE: 60 BPM | RESPIRATION RATE: 20 BRPM

## 2023-10-09 DIAGNOSIS — E11.9 TYPE 2 DIABETES MELLITUS WITHOUT COMPLICATION, WITH LONG-TERM CURRENT USE OF INSULIN (H): Primary | ICD-10-CM

## 2023-10-09 DIAGNOSIS — E66.01 MORBID OBESITY (H): ICD-10-CM

## 2023-10-09 DIAGNOSIS — Z79.4 TYPE 2 DIABETES MELLITUS WITHOUT COMPLICATION, WITH LONG-TERM CURRENT USE OF INSULIN (H): Primary | ICD-10-CM

## 2023-10-09 DIAGNOSIS — I10 ESSENTIAL HYPERTENSION: ICD-10-CM

## 2023-10-09 DIAGNOSIS — G47.33 OSA (OBSTRUCTIVE SLEEP APNEA): ICD-10-CM

## 2023-10-09 DIAGNOSIS — E78.5 HYPERLIPIDEMIA LDL GOAL <100: ICD-10-CM

## 2023-10-09 DIAGNOSIS — N52.9 ERECTILE DYSFUNCTION, UNSPECIFIED ERECTILE DYSFUNCTION TYPE: ICD-10-CM

## 2023-10-09 DIAGNOSIS — Z80.42 FH: MALIGNANT NEOPLASM OF PROSTATE: ICD-10-CM

## 2023-10-09 LAB — HBA1C MFR BLD: 5.8 % (ref 4–7)

## 2023-10-09 PROCEDURE — 99214 OFFICE O/P EST MOD 30 MIN: CPT | Performed by: FAMILY MEDICINE

## 2023-10-09 PROCEDURE — 83036 HEMOGLOBIN GLYCOSYLATED A1C: CPT | Performed by: FAMILY MEDICINE

## 2023-10-09 PROCEDURE — 36415 COLL VENOUS BLD VENIPUNCTURE: CPT | Performed by: FAMILY MEDICINE

## 2023-10-09 NOTE — NURSING NOTE
Mayur Celeste is here for an A1C.    Questioned patient about current smoking habits.  Pt. has never smoked.  PULSE regular  My Chart: active  CLASSIFICATION OF OVERWEIGHT AND OBESITY BY BMI                        Obesity Class           BMI(kg/m2)  Underweight                                    < 18.5  Normal                                         18.5-24.9  Overweight                                     25.0-29.9  OBESITY                     I                  30.0-34.9                             II                 35.0-39.9  EXTREME OBESITY             III                >40                            Patient's  BMI Body mass index is 39.17 kg/m .  http://hin.nhlbi.nih.gov/menuplanner/menu.cgi  Pre-visit planning  Immunizations - up to date  Colonoscopy - is up to date  Mammogram -   Asthma -   PHQ9 -    BETITO-7 -      The patient has verbalized that it is ok to leave a detailed voice message on the patient's cell phone with results/recommendations from this visit.

## 2023-10-09 NOTE — PROGRESS NOTES
Assessment & Plan     Type 2 diabetes mellitus without complication, with long-term current use of insulin (H)  Marked improvement with ozempic and habit changes, would continue to work for more weight loss, MTM assisting with med dosing  - HEMOGLOBIN A1C (BFP)  - VENOUS COLLECTION    Hyperlipidemia LDL goal <100  Discussed LDL goal with diabetes, LDL above goal but we agree to recheck lipids in 3 mo and decide on plan at that time as he has made big diet changes    Essential hypertension  Well controlled, continue current medications at current doses     Morbid obesity (H)  Making good progress, Continue to work on healthy diet and exercise, discussed healthy habits     ILEANA (obstructive sleep apnea)  cpap    FH: malignant neoplasm of prostate      Erectile dysfunction, unspecified erectile dysfunction type        Review of the result(s) of each unique test - labs  Ordering of each unique test  Prescription drug management         FUTURE APPOINTMENTS:       - Follow-up visit in 3 mo fasting for recheck all labs  Work on weight loss  Regular exercise    No follow-ups on file.    Rich Dumont MD  [facility]    Subjective   Michael is a 63 year old, presenting for the following health issues:  Recheck Medication (A1C)    HPI       Diabetes [id number] on ozempic 3 mo ago for new diabetes, down 14 pounds, cut out desserts    How often are you checking your blood sugar? Not at all  What concerns do you have today about your diabetes? None   Do you have any of these symptoms? (Select all that apply)  No numbness or tingling in feet.  No redness, sores or blisters on feet.  No complaints of excessive thirst.  No reports of blurry vision.  No significant changes to weight.          Hyperlipidemia Follow-Up    Are you regularly taking any medication or supplement to lower your cholesterol?   No  Are you having muscle aches or other side effects that you think could be caused by your  "cholesterol lowering medication?  No    Hypertension Follow-up    Do you check your blood pressure regularly outside of the clinic? Yes   Are you following a low salt diet? No  Are your blood pressures ever more than 140 on the top number (systolic) OR more   than 90 on the bottom number (diastolic), for example 140/90? No    BP Readings from Last 2 Encounters:   10/09/23 132/82   07/10/23 124/82     Hemoglobin A1C (%)   Date Value   07/05/2023 7.5 (A)     LDL Cholesterol Direct (mg/dL)   Date Value   07/03/2023 120   02/02/2022 112     How many servings of fruits and vegetables do you eat daily?  2-3  On average, how many sweetened beverages do you drink each day (Examples: soda, juice, sweet tea, etc.  Do NOT count diet or artificially sweetened beverages)?   1  How many days per week do you exercise enough to make your heart beat faster? 7  How many minutes a day do you exercise enough to make your heart beat faster? 30 - 60  How many days per week do you miss taking your medication? 0        Review of Systems   Constitutional, HEENT, cardiovascular, pulmonary, gi and gu systems are negative, except as otherwise noted.      Objective    /82 (BP Location: Right arm, Patient Position: Chair, Cuff Size: Adult Large)   Pulse 60   Temp 97.7  F (36.5  C) (Temporal)   Resp 20   Ht 1.778 m (5' 10\")   Wt 123.8 kg (273 lb)   BMI 39.17 kg/m    Body mass index is 39.17 kg/m .  Physical Exam   GENERAL: healthy, alert and no distress  NECK: no adenopathy, no asymmetry, masses, or scars and thyroid normal to palpation  RESP: lungs clear to auscultation - no rales, rhonchi or wheezes  CV: regular rate and rhythm, normal S1 S2, no S3 or S4, no murmur, click or rub, no peripheral edema and peripheral pulses strong  ABDOMEN: soft, nontender, no hepatosplenomegaly, no masses and bowel sounds normal  MS: no gross musculoskeletal defects noted, no edema    Results for orders placed or performed in visit on 10/09/23 (from " the past 24 hour(s))   HEMOGLOBIN A1C (BFP)   Result Value Ref Range    Hemoglobin A1C 5.8 4.0 - 7.0 %

## 2023-10-26 ENCOUNTER — MYC REFILL (OUTPATIENT)
Dept: FAMILY MEDICINE | Facility: CLINIC | Age: 63
End: 2023-10-26

## 2023-10-26 DIAGNOSIS — E11.9 TYPE 2 DIABETES MELLITUS WITHOUT COMPLICATION, WITH LONG-TERM CURRENT USE OF INSULIN (H): ICD-10-CM

## 2023-10-26 DIAGNOSIS — Z79.4 TYPE 2 DIABETES MELLITUS WITHOUT COMPLICATION, WITH LONG-TERM CURRENT USE OF INSULIN (H): ICD-10-CM

## 2023-10-27 NOTE — TELEPHONE ENCOUNTER
Please review pt's MYChart message,     Patient Comment: My last dose of this med will be on Sunday (10/29).  We will be out of town the following week starting Wed so will need renewal filled by 10/31.  Dr. Dumont had discussed increasing dose but wanted to consult with Jessie.

## 2023-11-17 DIAGNOSIS — E11.9 TYPE 2 DIABETES MELLITUS WITHOUT COMPLICATION, WITH LONG-TERM CURRENT USE OF INSULIN (H): ICD-10-CM

## 2023-11-17 DIAGNOSIS — Z79.4 TYPE 2 DIABETES MELLITUS WITHOUT COMPLICATION, WITH LONG-TERM CURRENT USE OF INSULIN (H): ICD-10-CM

## 2023-11-17 NOTE — TELEPHONE ENCOUNTER
Refilling Ozempic for 1mg due to availability. When 2mg is back in stock, would like to move up to 2mg.    Jessie Cotter, PharmD  Clinical Pharmacist  [facility]  General Clinic Phone: [phone]  Direct Office Phone: [phone]

## 2023-12-15 DIAGNOSIS — Z79.4 TYPE 2 DIABETES MELLITUS WITHOUT COMPLICATION, WITH LONG-TERM CURRENT USE OF INSULIN (H): Primary | ICD-10-CM

## 2023-12-15 DIAGNOSIS — E11.9 TYPE 2 DIABETES MELLITUS WITHOUT COMPLICATION, WITH LONG-TERM CURRENT USE OF INSULIN (H): Primary | ICD-10-CM

## 2023-12-15 NOTE — TELEPHONE ENCOUNTER
Tapering patient to 2mg Ozempic weekly. We have been waiting for available stock.    Called [facility] and confirmed they do have in stock.    Jessie Cotter, PharmD  Clinical Pharmacist  [facility]  General Clinic Phone: [phone]  Direct Office Phone: [phone]

## 2024-01-13 DIAGNOSIS — I10 ESSENTIAL HYPERTENSION: ICD-10-CM

## 2024-01-16 RX ORDER — AMLODIPINE BESYLATE 10 MG/1
10 TABLET ORAL DAILY
Qty: 90 TABLET | Refills: 0 | Status: SHIPPED | OUTPATIENT
Start: 2024-01-16 | End: 2024-02-26

## 2024-01-16 NOTE — TELEPHONE ENCOUNTER
Pt last seen 10/09/23 advised to return in 6 months.    Mayur Celeste is requesting a refill of:    Pending Prescriptions:                       Disp   Refills    amLODIPine (NORVASC) 10 MG tablet [Pharma*90 tab*0            Sig: Take 1 tablet by mouth once daily

## 2024-02-03 ENCOUNTER — NURSE TRIAGE (OUTPATIENT)
Dept: NURSING | Facility: CLINIC | Age: 64
End: 2024-02-03

## 2024-02-03 NOTE — TELEPHONE ENCOUNTER
"COVID Positive/Requesting COVID treatment    Patient is positive for COVID and requesting treatment options.    Date of positive COVID test (PCR or at home)? 2/3/2024  Current COVID symptoms: muscle or body aches, headache, sore throat, and congestion or runny nose  Date COVID symptoms began: 2/2/2024    Add'l Triage Notes:  Mild headache; pain rated 1/10 without analgesics.  No cough; no chills.  No severe symptoms whatsoever.  Currently on day one of covid episode.  Higher-risk status due to age, diabetes.    Pt does not have a specific [facility] provider.   Pt therefore intends to seek Paxlovid Rx treatment thru [facility] \"[facility]\" website telehealth option.   Contact information conveyed and confirmed with pt.    Freya Fulton RN  [facility] Nurse Advisor     Reason for Disposition   [1] HIGH RISK patient (e.g., weak immune system, age > 64 years, obesity with BMI 30 or higher, pregnant, chronic lung disease or other chronic medical condition) AND [2] COVID symptoms (e.g., cough, fever)  (Exceptions: Already seen by PCP and no new or worsening symptoms.)    Additional Information   Negative: SEVERE difficulty breathing (e.g., struggling for each breath, speaks in single words)   Negative: Difficult to awaken or acting confused (e.g., disoriented, slurred speech)   Negative: Bluish (or gray) lips or face now   Negative: Shock suspected (e.g., cold/pale/clammy skin, too weak to stand, low BP, rapid pulse)   Negative: Sounds like a life-threatening emergency to the triager   Negative: [1] Diagnosed or suspected COVID-19 AND [2] symptoms lasting 3 or more weeks   Negative: [1] COVID-19 exposure AND [2] no symptoms   Negative: COVID-19 vaccine reaction suspected (e.g., fever, headache, muscle aches) occurring 1 to 3 days after getting vaccine   Negative: COVID-19 vaccine, questions about   Negative: [1] Lives with someone known to have influenza (flu test positive) AND [2] flu-like " symptoms (e.g., cough, runny nose, sore throat, SOB; with or without fever)   Negative: [1] Possible COVID-19 symptoms AND [2] triager concerned about severity of symptoms or other causes   Negative: COVID-19 and breastfeeding, questions about   Negative: SEVERE or constant chest pain or pressure  (Exception: Mild central chest pain, present only when coughing.)   Negative: MODERATE difficulty breathing (e.g., speaks in phrases, SOB even at rest, pulse 100-120)   Negative: [1] Headache AND [2] stiff neck (can't touch chin to chest)   Negative: Oxygen level (e.g., pulse oximetry) 90 percent or lower   Negative: Chest pain or pressure  (Exception: MILD central chest pain, present only when coughing.)   Negative: [1] Drinking very little AND [2] dehydration suspected (e.g., no urine > 12 hours, very dry mouth, very lightheaded)   Negative: Patient sounds very sick or weak to the triager   Negative: MILD difficulty breathing (e.g., minimal/no SOB at rest, SOB with walking, pulse <100)   Negative: Fever > 103 F (39.4 C)   Negative: [1] Fever > 101 F (38.3 C) AND [2] age > 60 years   Negative: [1] Fever > 100.0 F (37.8 C) AND [2] bedridden (e.g., CVA, chronic illness, recovering from surgery)   Negative: Oxygen level (e.g., pulse oximetry) 91 to 94 percent    Protocols used: Coronavirus (COVID-19) Diagnosed or [id number]

## 2024-02-04 ENCOUNTER — HEALTH MAINTENANCE LETTER (OUTPATIENT)
Age: 64
End: 2024-02-04

## 2024-02-05 DIAGNOSIS — I10 ESSENTIAL HYPERTENSION: ICD-10-CM

## 2024-02-07 ENCOUNTER — MYC MEDICAL ADVICE (OUTPATIENT)
Dept: FAMILY MEDICINE | Facility: CLINIC | Age: 64
End: 2024-02-07

## 2024-02-07 DIAGNOSIS — I10 ESSENTIAL HYPERTENSION: ICD-10-CM

## 2024-02-07 RX ORDER — LISINOPRIL AND HYDROCHLOROTHIAZIDE 20; 25 MG/1; MG/1
1 TABLET ORAL DAILY
COMMUNITY
Start: 2024-02-07

## 2024-02-07 RX ORDER — LISINOPRIL AND HYDROCHLOROTHIAZIDE 20; 25 MG/1; MG/1
1 TABLET ORAL DAILY
Qty: 30 TABLET | Refills: 0 | Status: SHIPPED | OUTPATIENT
Start: 2024-02-07 | End: 2024-02-26

## 2024-02-07 NOTE — TELEPHONE ENCOUNTER
Pt scheduled appt for 02/26.    Mayur Celeste is requesting a refill of:    Pending Prescriptions:                       Disp   Refills    lisinopril-hydrochlorothiazide (ZESTORETI*30 tab*0            Sig: Take 1 tablet by mouth daily TK 1 T PO D

## 2024-02-07 NOTE — TELEPHONE ENCOUNTER
Mayur Celeste is requesting a refill of:    Refused Prescriptions:                       Disp   Refills    lisinopril-hydrochlorothiazide (ZESTORETIC*                Sig: Take 1 tablet by mouth once daily  Refused By: TAWANDA PATNIO  Reason for Refusal: Patient needs appointment    Needs fasting OV for refills

## 2024-02-26 ENCOUNTER — OFFICE VISIT (OUTPATIENT)
Dept: FAMILY MEDICINE | Facility: CLINIC | Age: 64
End: 2024-02-26

## 2024-02-26 VITALS
DIASTOLIC BLOOD PRESSURE: 84 MMHG | RESPIRATION RATE: 20 BRPM | BODY MASS INDEX: 37.37 KG/M2 | TEMPERATURE: 97.5 F | HEART RATE: 68 BPM | SYSTOLIC BLOOD PRESSURE: 136 MMHG | HEIGHT: 70 IN | WEIGHT: 261 LBS

## 2024-02-26 DIAGNOSIS — E66.01 MORBID OBESITY (H): ICD-10-CM

## 2024-02-26 DIAGNOSIS — E11.9 TYPE 2 DIABETES MELLITUS WITHOUT COMPLICATION, WITH LONG-TERM CURRENT USE OF INSULIN (H): Primary | ICD-10-CM

## 2024-02-26 DIAGNOSIS — Z80.42 FH: MALIGNANT NEOPLASM OF PROSTATE: ICD-10-CM

## 2024-02-26 DIAGNOSIS — I10 ESSENTIAL HYPERTENSION: ICD-10-CM

## 2024-02-26 DIAGNOSIS — Z79.4 TYPE 2 DIABETES MELLITUS WITHOUT COMPLICATION, WITH LONG-TERM CURRENT USE OF INSULIN (H): Primary | ICD-10-CM

## 2024-02-26 DIAGNOSIS — E78.5 HYPERLIPIDEMIA LDL GOAL <100: ICD-10-CM

## 2024-02-26 DIAGNOSIS — G47.33 OSA (OBSTRUCTIVE SLEEP APNEA): ICD-10-CM

## 2024-02-26 LAB
ALBUMIN SERPL-MCNC: 4.6 G/DL (ref 3.6–5.1)
ALBUMIN/GLOB SERPL: 1.5 {RATIO} (ref 1–2.5)
ALP SERPL-CCNC: 42 U/L (ref 33–130)
ALT 1742-6: 53 U/L (ref 0–32)
AST 1920-8: 44 U/L (ref 0–35)
BILIRUB SERPL-MCNC: 0.8 MG/DL (ref 0.2–1.2)
BUN SERPL-MCNC: 21 MG/DL (ref 7–25)
BUN/CREATININE RATIO: 25.9 (ref 6–32)
CALCIUM SERPL-MCNC: 9.5 MG/DL (ref 8.6–10.3)
CHLORIDE SERPLBLD-SCNC: 103.2 MMOL/L (ref 98–110)
CHOLEST SERPL-MCNC: 182 MG/DL (ref 0–199)
CHOLEST/HDLC SERPL: 3 {RATIO} (ref 0–5)
CO2 SERPL-SCNC: 26.9 MMOL/L (ref 20–32)
CREAT SERPL-MCNC: 0.81 MG/DL (ref 0.6–1.3)
GLOBULIN, CALCULATED - QUEST: 3 (ref 1.9–3.7)
GLUCOSE SERPL-MCNC: 109 MG/DL (ref 60–99)
HDLC SERPL-MCNC: 53 MG/DL (ref 40–150)
HEMOGLOBIN A1C: 5.8 % (ref 4–5.6)
LDLC SERPL CALC-MCNC: 110 MG/DL (ref 0–130)
POTASSIUM SERPL-SCNC: 4.6 MMOL/L (ref 3.5–5.3)
PROT SERPL-MCNC: 7.6 G/DL (ref 6.1–8.1)
SODIUM SERPL-SCNC: 139.9 MMOL/L (ref 135–146)
TRIGL SERPL-MCNC: 96 MG/DL (ref 0–149)

## 2024-02-26 PROCEDURE — 80053 COMPREHEN METABOLIC PANEL: CPT | Performed by: FAMILY MEDICINE

## 2024-02-26 PROCEDURE — 80061 LIPID PANEL: CPT | Performed by: FAMILY MEDICINE

## 2024-02-26 PROCEDURE — 99214 OFFICE O/P EST MOD 30 MIN: CPT | Performed by: FAMILY MEDICINE

## 2024-02-26 PROCEDURE — 83036 HEMOGLOBIN GLYCOSYLATED A1C: CPT | Performed by: FAMILY MEDICINE

## 2024-02-26 PROCEDURE — 36415 COLL VENOUS BLD VENIPUNCTURE: CPT | Performed by: FAMILY MEDICINE

## 2024-02-26 RX ORDER — AMLODIPINE BESYLATE 10 MG/1
10 TABLET ORAL DAILY
Qty: 90 TABLET | Refills: 1 | Status: SHIPPED | OUTPATIENT
Start: 2024-02-26 | End: 2024-08-06

## 2024-02-26 RX ORDER — LISINOPRIL AND HYDROCHLOROTHIAZIDE 20; 25 MG/1; MG/1
1 TABLET ORAL DAILY
Qty: 90 TABLET | Refills: 1 | Status: SHIPPED | OUTPATIENT
Start: 2024-02-26 | End: 2024-08-06

## 2024-02-26 NOTE — NURSING NOTE
Mayur Celeste is here for a diabetic check and medication refill.    Questioned patient about current smoking habits.  Pt. has never smoked.  Body mass index is 37.45 kg/m .  PULSE regular  My Chart: active  CLASSIFICATION OF OVERWEIGHT AND OBESITY BY BMI                        Obesity Class           BMI(kg/m2)  Underweight                                    < 18.5  Normal                                         18.5-24.9  Overweight                                     25.0-29.9  OBESITY                     I                  30.0-34.9                             II                 35.0-39.9  EXTREME OBESITY             III                >40                            Patient's  BMI Body mass index is 37.45 kg/m .  http://hin.nhlbi.nih.gov/menuplanner/menu.cgi    Pre-visit planning  Immunizations - up to date  Colonoscopy - is up to date  Mammogram -   Asthma -   PHQ9 -    BETITO-7 -      The patient has verbalized that it is ok to leave a detailed voice message on the patient's cell phone with results/recommendations from this visit.

## 2024-02-26 NOTE — PROGRESS NOTES
"  Assessment & Plan     Type 2 diabetes mellitus without complication, with long-term current use of insulin (H)  Well controlled with ozempic, continue current medications at current doses   - HEMOGLOBIN A1C (BFP)  - VENOUS COLLECTION  - Comprehensive Metobolic Panel (BFP)    Hyperlipidemia LDL goal <100  Control uncertain, Continue to work on healthy diet and exercise, discussed healthy habits pending ;abs  - Lipid Panel (BFP)  - Comprehensive Metobolic Panel (BFP)    Essential hypertension  Well conrolled, continue current medications at current doses   - amLODIPine (NORVASC) 10 MG tablet  Dispense: 90 tablet; Refill: 1  - lisinopril-hydrochlorothiazide (ZESTORETIC) 20-25 MG tablet  Dispense: 90 tablet; Refill: 1  - Comprehensive Metobolic Panel (BFP)    ILEANA (obstructive sleep apnea)  cpap    FH: malignant neoplasm of prostate      Morbid obesity (H)  Doing well with ozempic      Review of the result(s) of each unique test - labs  Ordering of each unique test  Prescription drug management        BMI  Estimated body mass index is 37.45 kg/m  as calculated from the following:    Height as of this encounter: 1.778 m (5' 10\").    Weight as of this encounter: 118.4 kg (261 lb).   Weight management plan: Discussed healthy diet and exercise guidelines      FUTURE APPOINTMENTS:       - Follow-up visit in 6 mo  Work on weight loss  Regular exercise    No follow-ups on file.    Subjective   Michael is a 64 year old, presenting for the following health issues:  Recheck Medication    HPI       Diabetes Follow-up-ozempic    How often are you checking your blood sugar? Not at all  What concerns do you have today about your diabetes? None   Do you have any of these symptoms? (Select all that apply)  Numbness in feet          Hyperlipidemia Follow-Up    Are you regularly taking any medication or supplement to lower your cholesterol?   No  Are you having muscle aches or other side effects that you think could be caused by your " "cholesterol lowering medication?  No    Hypertension Follow-up    Do you check your blood pressure regularly outside of the clinic? Yes   Are you following a low salt diet? No  Are your blood pressures ever more than 140 on the top number (systolic) OR more   than 90 on the bottom number (diastolic), for example 140/90? No    BP Readings from Last 2 Encounters:   02/26/24 136/84   10/09/23 132/82     Hemoglobin A1C (%)   Date Value   10/09/2023 5.8   07/05/2023 7.5 (A)     LDL Cholesterol Direct (mg/dL)   Date Value   07/03/2023 120   02/02/2022 112         LIEANA- cpap  How many servings of fruits and vegetables do you eat daily?  2-3  On average, how many sweetened beverages do you drink each day (Examples: soda, juice, sweet tea, etc.  Do NOT count diet or artificially sweetened beverages)?   1  How many days per week do you exercise enough to make your heart beat faster? 6  How many minutes a day do you exercise enough to make your heart beat faster? 30 - 60  How many days per week do you miss taking your medication? 0        Review of Systems  Constitutional, HEENT, cardiovascular, pulmonary, gi and gu systems are negative, except as otherwise noted.      Objective    /84 (BP Location: Right arm, Patient Position: Chair, Cuff Size: Adult Large)   Pulse 68   Temp 97.5  F (36.4  C) (Temporal)   Resp 20   Ht 1.778 m (5' 10\")   Wt 118.4 kg (261 lb)   BMI 37.45 kg/m    Body mass index is 37.45 kg/m .  Physical Exam   GENERAL: alert and no distress  NECK: no adenopathy, no asymmetry, masses, or scars  RESP: lungs clear to auscultation - no rales, rhonchi or wheezes  CV: regular rate and rhythm, normal S1 S2, no S3 or S4, no murmur, click or rub, no peripheral edema  ABDOMEN: soft, nontender, no hepatosplenomegaly, no masses and bowel sounds normal  MS: no gross musculoskeletal defects noted, no edema  PSYCH: mentation appears normal, affect normal/bright    Results for orders placed or performed in visit on " 02/26/24 (from the past 24 hour(s))   HEMOGLOBIN A1C (BFP)   Result Value Ref Range    Hemoglobin A1C 5.8 (A) 4 - 5.6 %           Signed Electronically by: Rich Dumont MD

## 2024-06-17 PROBLEM — Z76.89 HEALTH CARE HOME: Status: RESOLVED | Noted: 2020-12-29 | Resolved: 2024-06-17

## 2024-06-23 ENCOUNTER — HEALTH MAINTENANCE LETTER (OUTPATIENT)
Age: 64
End: 2024-06-23

## 2024-08-06 ENCOUNTER — OFFICE VISIT (OUTPATIENT)
Dept: FAMILY MEDICINE | Facility: CLINIC | Age: 64
End: 2024-08-06

## 2024-08-06 VITALS
SYSTOLIC BLOOD PRESSURE: 120 MMHG | TEMPERATURE: 97.8 F | HEART RATE: 60 BPM | BODY MASS INDEX: 37.74 KG/M2 | DIASTOLIC BLOOD PRESSURE: 72 MMHG | RESPIRATION RATE: 20 BRPM | HEIGHT: 70 IN | WEIGHT: 263.6 LBS

## 2024-08-06 DIAGNOSIS — I10 ESSENTIAL HYPERTENSION: ICD-10-CM

## 2024-08-06 DIAGNOSIS — Z79.4 TYPE 2 DIABETES MELLITUS WITHOUT COMPLICATION, WITH LONG-TERM CURRENT USE OF INSULIN (H): ICD-10-CM

## 2024-08-06 DIAGNOSIS — G47.33 OSA (OBSTRUCTIVE SLEEP APNEA): ICD-10-CM

## 2024-08-06 DIAGNOSIS — E66.01 MORBID OBESITY (H): ICD-10-CM

## 2024-08-06 DIAGNOSIS — E78.5 HYPERLIPIDEMIA LDL GOAL <100: ICD-10-CM

## 2024-08-06 DIAGNOSIS — N52.9 ERECTILE DYSFUNCTION, UNSPECIFIED ERECTILE DYSFUNCTION TYPE: ICD-10-CM

## 2024-08-06 DIAGNOSIS — Z80.42 FH: MALIGNANT NEOPLASM OF PROSTATE: ICD-10-CM

## 2024-08-06 DIAGNOSIS — Z13.89 SCREENING FOR DIABETIC PERIPHERAL NEUROPATHY: ICD-10-CM

## 2024-08-06 DIAGNOSIS — E11.9 TYPE 2 DIABETES MELLITUS WITHOUT COMPLICATION, WITH LONG-TERM CURRENT USE OF INSULIN (H): ICD-10-CM

## 2024-08-06 DIAGNOSIS — Z00.00 ROUTINE HISTORY AND PHYSICAL EXAMINATION OF ADULT: Primary | ICD-10-CM

## 2024-08-06 LAB
ALBUMIN (URINE) MG/L: 10
ALBUMIN SERPL-MCNC: 4.5 G/DL (ref 3.6–5.1)
ALBUMIN URINE MG/G CR: <30 MG/G CREATININE
ALP SERPL-CCNC: 38 U/L (ref 33–130)
ALT 1742-6: 33 U/L (ref 0–32)
AST 1920-8: 22 U/L (ref 0–35)
BILIRUB SERPL-MCNC: 0.9 MG/DL (ref 0.2–1.2)
BUN SERPL-MCNC: 25 MG/DL (ref 7–25)
BUN/CREATININE RATIO: 27 (ref 6–32)
CALCIUM SERPL-MCNC: 9.6 MG/DL (ref 8.6–10.3)
CHLORIDE SERPLBLD-SCNC: 102.1 MMOL/L (ref 98–110)
CHOLEST SERPL-MCNC: 189 MG/DL (ref 0–199)
CHOLEST/HDLC SERPL: 4 {RATIO} (ref 0–5)
CO2 SERPL-SCNC: 26.2 MMOL/L (ref 20–32)
CREAT SERPL-MCNC: 0.91 MG/DL (ref 0.6–1.3)
CREATININE URINE MG/DL: 100 MG/DL
GLUCOSE SERPL-MCNC: 113 MG/DL (ref 60–99)
HDLC SERPL-MCNC: 45 MG/DL (ref 40–150)
HEMOGLOBIN A1C: 5.5 % (ref 4–5.6)
LDLC SERPL CALC-MCNC: 101 MG/DL
POTASSIUM SERPL-SCNC: 4.37 MMOL/L (ref 3.5–5.3)
PROT SERPL-MCNC: 7.1 G/DL (ref 6.1–8.1)
SODIUM SERPL-SCNC: 137.4 MMOL/L (ref 135–146)
TRIGL SERPL-MCNC: 214 MG/DL (ref 0–149)

## 2024-08-06 PROCEDURE — 80061 LIPID PANEL: CPT | Performed by: FAMILY MEDICINE

## 2024-08-06 PROCEDURE — 80053 COMPREHEN METABOLIC PANEL: CPT | Performed by: FAMILY MEDICINE

## 2024-08-06 PROCEDURE — 82043 UR ALBUMIN QUANTITATIVE: CPT | Performed by: FAMILY MEDICINE

## 2024-08-06 PROCEDURE — 82570 ASSAY OF URINE CREATININE: CPT | Performed by: FAMILY MEDICINE

## 2024-08-06 PROCEDURE — 84153 ASSAY OF PSA TOTAL: CPT | Mod: 90 | Performed by: FAMILY MEDICINE

## 2024-08-06 PROCEDURE — 99396 PREV VISIT EST AGE 40-64: CPT | Performed by: FAMILY MEDICINE

## 2024-08-06 PROCEDURE — 36415 COLL VENOUS BLD VENIPUNCTURE: CPT | Performed by: FAMILY MEDICINE

## 2024-08-06 PROCEDURE — 83036 HEMOGLOBIN GLYCOSYLATED A1C: CPT | Performed by: FAMILY MEDICINE

## 2024-08-06 RX ORDER — LISINOPRIL AND HYDROCHLOROTHIAZIDE 20; 25 MG/1; MG/1
1 TABLET ORAL DAILY
Qty: 90 TABLET | Refills: 1 | Status: SHIPPED | OUTPATIENT
Start: 2024-08-06

## 2024-08-06 RX ORDER — SILDENAFIL 100 MG/1
100 TABLET, FILM COATED ORAL DAILY PRN
Qty: 30 TABLET | Refills: 1 | Status: SHIPPED | OUTPATIENT
Start: 2024-08-06

## 2024-08-06 RX ORDER — AMLODIPINE BESYLATE 10 MG/1
10 TABLET ORAL DAILY
Qty: 90 TABLET | Refills: 1 | Status: SHIPPED | OUTPATIENT
Start: 2024-08-06

## 2024-08-06 NOTE — NURSING NOTE
Mayur Celeste is here for a CPX.    Pre-visit planning  Immunizations -up to date  Colonoscopy -is up to date  Mammogram -  Asthma test --  PHQ9 -  BETITO 7 -      Questioned patient about current smoking habits.  Pt. has never smoked.  Body mass index is 37.82 kg/m .  PULSE regular  My Chart: active  CLASSIFICATION OF OVERWEIGHT AND OBESITY BY BMI                        Obesity Class           BMI(kg/m2)  Underweight                                    < 18.5  Normal                                         18.5-24.9  Overweight                                     25.0-29.9  OBESITY                     I                  30.0-34.9                             II                 35.0-39.9  EXTREME OBESITY             III                >40                            Patient's  BMI Body mass index is 37.82 kg/m .      The patient has verbalized that it is ok to leave a detailed voice message on the patient's cell phone with results/recommendations from this visit.

## 2024-08-06 NOTE — PROGRESS NOTES
3  SUBJECTIVE:   CC: Mayur Celeste is an 64 year old male who presents for preventive health visit.       Patient has been advised of split billing requirements and indicates understanding: Yes  Healthy Habits:  Do you get at least three servings of calcium containing foods daily (dairy, green leafy vegetables, etc.)? yes  Amount of exercise or daily activities, outside of work: 7 day(s) per week  Problems taking medications regularly No  Medication side effects: No  Have you had an eye exam in the past two years? yes  Do you see a dentist twice per year? yes  Do you have sleep apnea, excessive snoring or daytime drowsiness?yes      Diabetes Follow-up    How often are you checking your blood sugar? Not at all  What concerns do you have today about your diabetes? None   Do you have any of these symptoms? (Select all that apply)  No numbness or tingling in feet.  No redness, sores or blisters on feet.  No complaints of excessive thirst.  No reports of blurry vision.  No significant changes to weight.  Have you had a diabetic eye exam in the last 12 months? yes        BP Readings from Last 2 Encounters:   08/06/24 120/72   02/26/24 136/84     Hemoglobin A1C (%)   Date Value   02/26/2024 5.8 (A)   10/09/2023 5.8   07/05/2023 7.5 (A)     LDL Cholesterol Direct (mg/dL)   Date Value   02/26/2024 110   07/03/2023 120             Hypertension Follow-up    Do you check your blood pressure regularly outside of the clinic? Yes   Are you following a low salt diet? No  Are your blood pressures ever more than 140 on the top number (systolic) OR more   than 90 on the bottom number (diastolic), for example 140/90? No    Today's PHQ-2 Score:       2/26/2024    10:14 AM 7/3/2023     8:41 AM   PHQ-2 ( 1999 [employer])   Q1: Little interest or pleasure in doing things 0 0   Q2: Feeling down, depressed or hopeless 0 0   PHQ-2 Score 0 0       Abuse: Current or Past(Physical, Sexual or Emotional)- No  Do you feel safe in your environment?  Yes        Social History     Tobacco Use    Smoking status: Never     Passive exposure: Never    Smokeless tobacco: Never   Substance Use Topics    Alcohol use: Yes     Alcohol/week: 4.0 standard drinks of alcohol     Types: 4 Standard drinks or equivalent per week     If you drink alcohol do you typically have >3 drinks per day or >7 drinks per week? No                      Last PSA:   [facility] PSA   Date Value Ref Range Status   07/03/2023 1.90 < OR = 4.00 ng/mL Final     Comment:     The total PSA value from this assay system is   standardized against the WHO standard. The test   result will be approximately 20% lower when compared   to the equimolar-standardized total PSA (Essie   [place]). Comparison of serial PSA results should be   interpreted with this fact in mind.     This test was performed using the Siemens   chemiluminescent method. Values obtained from   different assay methods cannot be used  interchangeably. PSA levels, regardless of  value, should not be interpreted as absolute  evidence of the presence or absence of disease.         Reviewed orders with patient. Reviewed health maintenance and updated orders accordingly - Yes  BP Readings from Last 3 Encounters:   08/06/24 120/72   02/26/24 136/84   10/09/23 132/82    Wt Readings from Last 3 Encounters:   08/06/24 119.6 kg (263 lb 9.6 oz)   02/26/24 118.4 kg (261 lb)   10/09/23 123.8 kg (273 lb)                  Patient Active Problem List   Diagnosis    Status post THR (total hip replacement)    Osteoarthritis    Essential hypertension, benign    Family history of prostate cancer    Morbid obesity (H)    Type 2 diabetes mellitus without complication, without long-term current use of insulin (H)     Past Surgical History:   Procedure Laterality Date    ARTHROPLASTY HIP      left    ARTHROPLASTY HIP  12/21/2011    Procedure:ARTHROPLASTY HIP; Right Total Hip Arthroplasty, Right Hip Hardware Removal     ; Surgeon:ISAAC RODRIGUEZ; Location: OR     ORTHOPEDIC SURGERY      right hip pinning       Social History     Tobacco Use    Smoking status: Never     Passive exposure: Never    Smokeless tobacco: Never   Substance Use Topics    Alcohol use: Yes     Alcohol/week: 4.0 standard drinks of alcohol     Types: 4 Standard drinks or equivalent per week     Family History   Problem Relation Age of Onset    Prostate Cancer Father     Coronary Artery Disease Father     Colon Cancer Other     Cerebrovascular Disease Other     Diabetes No family hx of          Current Outpatient Medications   Medication Sig Dispense Refill    amLODIPine (NORVASC) 10 MG tablet Take 1 tablet (10 mg) by mouth daily 90 tablet 1    lisinopril-hydrochlorothiazide (ZESTORETIC) 20-25 MG tablet Take 1 tablet by mouth daily TK 1 T PO D 90 tablet 1    Semaglutide, 2 MG/DOSE, (OZEMPIC) 8 MG/3ML pen Inject 2 mg subcutaneously every 7 days 9 mL 1    sildenafil (VIAGRA) 100 MG tablet Take 1 tablet (100 mg) by mouth daily as needed 30 tablet 1    naproxen sodium (ANAPROX) 220 MG tablet Take 220 mg by mouth 2 times daily (with meals).       No Known Allergies  Recent Labs   Lab Test 02/26/24  0000 10/09/23  1101 07/05/23  0947 07/03/23  1214 12/13/22  0000 02/02/22  0000   A1C 5.8* 5.8 7.5*  --   --   --      --   --  120  --  112   HDL 53  --   --  50  --  47   TRIG 96  --   --  119  --  139   CR 0.81  --   --  0.91   < > 0.90   POTASSIUM 4.6  --   --  4.28   < > 4.57    < > = values in this interval not displayed.        Reviewed and updated as needed this visit by clinical staff   Tobacco                Reviewed and updated as needed this visit by Provider                  Past Medical History:   Diagnosis Date    Chronic pain     back pain    Hypertension     PONV (postoperative nausea and vomiting)       Past Surgical History:   Procedure Laterality Date    ARTHROPLASTY HIP      left    ARTHROPLASTY HIP  12/21/2011    Procedure:ARTHROPLASTY HIP; Right Total Hip Arthroplasty, Right Hip  "Hardware Removal     ; Surgeon:ISAAC RODRIGUEZ; Location:[facility] OR    ORTHOPEDIC SURGERY      right hip pinning       ROS:  CONSTITUTIONAL: NEGATIVE for fever, chills, change in weight  INTEGUMENTARY/SKIN: NEGATIVE for worrisome rashes, moles or lesions  EYES: NEGATIVE for vision changes or irritation  ENT: NEGATIVE for ear, mouth and throat problems  RESP: NEGATIVE for significant cough or SOB  CV: NEGATIVE for chest pain, palpitations or peripheral edema  GI: NEGATIVE for nausea, abdominal pain, heartburn, or change in bowel habits   male: negative for dysuria, hematuria, decreased urinary stream, erectile dysfunction, urethral discharge  MUSCULOSKELETAL: NEGATIVE for significant arthralgias or myalgia  NEURO: NEGATIVE for weakness, dizziness or paresthesias  PSYCHIATRIC: NEGATIVE for changes in mood or affect    OBJECTIVE:   /72 (BP Location: Left arm, Patient Position: Chair, Cuff Size: Adult Large)   Pulse 60   Temp 97.8  F (36.6  C) (Temporal)   Resp 20   Ht 1.778 m (5' 10\")   Wt 119.6 kg (263 lb 9.6 oz)   BMI 37.82 kg/m    EXAM:  GENERAL: alert and no distress  EYES: Eyes grossly normal to inspection, PERRL and conjunctivae and sclerae normal  HENT: ear canals and TM's normal, nose and mouth without ulcers or lesions  NECK: no adenopathy, no asymmetry, masses, or scars  RESP: lungs clear to auscultation - no rales, rhonchi or wheezes  CV: regular rate and rhythm, normal S1 S2, no S3 or S4, no murmur, click or rub, no peripheral edema  ABDOMEN: soft, nontender, no hepatosplenomegaly, no masses and bowel sounds normal   (male): normal male genitalia without lesions or urethral discharge, no hernia  MS: no gross musculoskeletal defects noted, no edema  SKIN: no suspicious lesions or rashes  NEURO: Normal strength and tone, mentation intact and speech normal  PSYCH: mentation appears normal, affect normal/bright  Diabetic foot exam: normal DP and PT pulses and no trophic changes or ulcerative " "lesions    Diagnostic Test Results:  Labs reviewed in Epic  HgbA1C - 5.5    ASSESSMENT/PLAN:   (Z00.00) Routine history and physical examination of adult  (primary encounter diagnosis)  Comment: discussed preventitive healthcare   Plan: Continue to work on healthy diet and exercise, discussed healthy habits     (E11.9,  Z79.4) Type 2 diabetes mellitus without complication, with long-term current use of insulin (H)  Comment: well controlled  Plan: HEMOGLOBIN A1C (BFP), VENOUS COLLECTION        continue current medications at current doses     (E78.5) Hyperlipidemia LDL goal <100  Comment: control uncertain  Plan: continue current medications at current doses pending labs    (I10) Essential hypertension  Comment: well controlled  Plan: continue current medications at current doses     (G47.33) ILEANA (obstructive sleep apnea)  Comment: cpap  Plan:     (Z80.42) FH: malignant neoplasm of prostate  Comment:   Plan:     (E66.01) Morbid obesity (H)  Comment:   Plan: Continue to work on healthy diet and exercise, discussed healthy habits     (Z13.89) Screening for diabetic peripheral neuropathy  Comment:   Plan:     Patient has been advised of split billing requirements and indicates understanding: Yes  COUNSELING:  Reviewed preventive health counseling, as reflected in patient instructions       Regular exercise       Healthy diet/nutrition       Vision screening       Hearing screening       Colorectal cancer screening       Prostate cancer screening    Estimated body mass index is 37.82 kg/m  as calculated from the following:    Height as of this encounter: 1.778 m (5' 10\").    Weight as of this encounter: 119.6 kg (263 lb 9.6 oz).    Weight management plan: Discussed healthy diet and exercise guidelines    He reports that he has never smoked. He has never been exposed to tobacco smoke. He has never used smokeless tobacco.      Counseling Resources:  ATP IV Guidelines  Pooled Cohorts Equation Calculator  FRAX Risk " Assessment  ICSI Preventive Guidelines  Dietary Guidelines for Americans, 2010  USDA's MyPlate  ASA Prophylaxis  Lung CA Screening    Rich Dumont MD  [facility]

## 2024-08-07 LAB — ABBOTT PSA - QUEST: 2.72 NG/ML

## 2024-08-26 ENCOUNTER — TRANSFERRED RECORDS (OUTPATIENT)
Dept: FAMILY MEDICINE | Facility: CLINIC | Age: 64
End: 2024-08-26

## 2024-11-10 ENCOUNTER — HEALTH MAINTENANCE LETTER (OUTPATIENT)
Age: 64
End: 2024-11-10

## 2025-01-09 ENCOUNTER — OFFICE VISIT (OUTPATIENT)
Dept: FAMILY MEDICINE | Facility: CLINIC | Age: 65
End: 2025-01-09

## 2025-01-09 VITALS
TEMPERATURE: 97.4 F | HEART RATE: 60 BPM | HEIGHT: 70 IN | BODY MASS INDEX: 37.22 KG/M2 | WEIGHT: 260 LBS | DIASTOLIC BLOOD PRESSURE: 72 MMHG | RESPIRATION RATE: 20 BRPM | SYSTOLIC BLOOD PRESSURE: 130 MMHG

## 2025-01-09 DIAGNOSIS — Z79.4 TYPE 2 DIABETES MELLITUS WITHOUT COMPLICATION, WITH LONG-TERM CURRENT USE OF INSULIN (H): Primary | ICD-10-CM

## 2025-01-09 DIAGNOSIS — E11.9 TYPE 2 DIABETES MELLITUS WITHOUT COMPLICATION, WITH LONG-TERM CURRENT USE OF INSULIN (H): Primary | ICD-10-CM

## 2025-01-09 DIAGNOSIS — R97.20 ELEVATED PROSTATE SPECIFIC ANTIGEN (PSA): ICD-10-CM

## 2025-01-09 DIAGNOSIS — I10 ESSENTIAL HYPERTENSION: ICD-10-CM

## 2025-01-09 PROBLEM — M48.061 LUMBAR SPINAL STENOSIS: Status: ACTIVE | Noted: 2024-08-01

## 2025-01-09 LAB
BUN SERPL-MCNC: 21 MG/DL (ref 7–25)
BUN/CREATININE RATIO: 24 (ref 6–32)
CALCIUM SERPL-MCNC: 9.4 MG/DL (ref 8.6–10.3)
CHLORIDE SERPLBLD-SCNC: 102.6 MMOL/L (ref 98–110)
CO2 SERPL-SCNC: 30.5 MMOL/L (ref 20–32)
CREAT SERPL-MCNC: 0.86 MG/DL (ref 0.6–1.3)
GLUCOSE SERPL-MCNC: 104 MG/DL (ref 60–99)
HEMOGLOBIN A1C: 5.7 % (ref 4–5.6)
POTASSIUM SERPL-SCNC: 3.82 MMOL/L (ref 3.5–5.3)
SODIUM SERPL-SCNC: 141.4 MMOL/L (ref 135–146)

## 2025-01-09 RX ORDER — LISINOPRIL AND HYDROCHLOROTHIAZIDE 20; 25 MG/1; MG/1
1 TABLET ORAL DAILY
Qty: 90 TABLET | Refills: 1 | Status: SHIPPED | OUTPATIENT
Start: 2025-01-09

## 2025-01-09 RX ORDER — AMLODIPINE BESYLATE 10 MG/1
10 TABLET ORAL DAILY
Qty: 90 TABLET | Refills: 1 | Status: SHIPPED | OUTPATIENT
Start: 2025-01-09

## 2025-01-09 NOTE — NURSING NOTE
Mayur Celeste is here for a diabetic check and medication refill.    Questioned patient about current smoking habits.  Pt. has never smoked.  Body mass index is 37.31 kg/m .  PULSE regular  My Chart: active  CLASSIFICATION OF OVERWEIGHT AND OBESITY BY BMI                        Obesity Class           BMI(kg/m2)  Underweight                                    < 18.5  Normal                                         18.5-24.9  Overweight                                     25.0-29.9  OBESITY                     I                  30.0-34.9                             II                 35.0-39.9  EXTREME OBESITY             III                >40                            Patient's  BMI Body mass index is 37.31 kg/m .  http://hin.nhlbi.nih.gov/menuplanner/menu.cgi    Pre-visit planning  Immunizations - up to date  Colonoscopy - is up to date  Mammogram -   Asthma -   PHQ9 -    BETITO-7 -      The patient has verbalized that it is ok to leave a detailed voice message on the patient's cell phone with results/recommendations from this visit.

## 2025-01-09 NOTE — PROGRESS NOTES
Assessment & Plan     Essential hypertension-stable, refilled without change    - amLODIPine (NORVASC) 10 MG tablet  Dispense: 90 tablet; Refill: 1  - lisinopril-hydrochlorothiazide (ZESTORETIC) 20-25 MG tablet  Dispense: 90 tablet; Refill: 1  - Basic Metabolic Panel (BFP)    Type 2 diabetes mellitus without complication, with long-term current use of insulin (H)  Stable, excellent control continues  - HEMOGLOBIN A1C (BFP)  - VENOUS COLLECTION  - Semaglutide, 2 MG/DOSE, (OZEMPIC) 8 MG/3ML pen  Dispense: 9 mL; Refill: 1  - Basic Metabolic Panel (BFP)    Elevated prostate specific antigen (PSA)  Recheck today, may need uro referral  - PSA Total ([facility])          Follow up 6 months OV    No follow-ups on file.    Subjective   Michael is a 64 year old, presenting for the following health issues:  Recheck Medication and RECHECK (PSA)    HPI     PSA: jump of .82ng/mL within last 1 year. Recheck today. Father had prostate cancer.     Diabetes Follow-up    How often are you checking your blood sugar? Not at all  What concerns do you have today about your diabetes? None   Do you have any of these symptoms? (Select all that apply)  No numbness or tingling in feet.  No redness, sores or blisters on feet.  No complaints of excessive thirst.  No reports of blurry vision.  No significant changes to weight.  Taking 2mg Ozempic weekly. No issues with meds. Down 3lbs vs last visit. Notes slow weight loss. Has mostly cut out alcohol in last 1-2 months, has helped overall.     BP Readings from Last 2 Encounters:   01/09/25 130/72   08/06/24 120/72     Hemoglobin A1C (%)   Date Value   01/09/2025 5.7 (A)   08/06/2024 5.5   10/09/2023 5.8   07/05/2023 7.5 (A)     LDL Cholesterol Direct (mg/dL)   Date Value   02/26/2024 110   07/03/2023 120             Hypertension Follow-up    Do you check your blood pressure regularly outside of the clinic? Yes   Are you following a low salt diet? Yes  Are your blood pressures ever more than 140 on the  "top number (systolic) OR more   than 90 on the bottom number (diastolic), for example 140/90? No  BP at home: 130s/70s. No other low/high readings that he has seen, checking BP only on occasion. No issues with meds.         Review of Systems  Constitutional, neuro, ENT, endocrine, pulmonary, cardiac, gastrointestinal, genitourinary, musculoskeletal, integument and psychiatric systems are negative, except as otherwise noted.      Objective    /72 (BP Location: Right arm, Patient Position: Chair, Cuff Size: Adult Large)   Pulse 60   Temp 97.4  F (36.3  C) (Temporal)   Resp 20   Ht 1.778 m (5' 10\")   Wt 117.9 kg (260 lb)   BMI 37.31 kg/m    Body mass index is 37.31 kg/m .  Physical Exam   GENERAL: alert and no distress  HENT: ear canals and TM's normal, nose and mouth without ulcers or lesions  NECK: no adenopathy, no asymmetry, masses, or scars  RESP: lungs clear to auscultation - no rales, rhonchi or wheezes  CV: regular rate and rhythm, normal S1 S2, no S3 or S4, no murmur, click or rub, no peripheral edema  ABDOMEN: soft, nontender, no hepatosplenomegaly, no masses and bowel sounds normal  MS: no gross musculoskeletal defects noted, no edema  NEURO: Normal strength and tone, mentation intact and speech normal  PSYCH: mentation appears normal, affect normal/bright    Results for orders placed or performed in visit on 01/09/25 (from the past 24 hours)   HEMOGLOBIN A1C (BFP)   Result Value Ref Range    Hemoglobin A1C 5.7 (A) 4 - 5.6 %           Signed Electronically by: Clint Richmond PA-C      "

## 2025-01-10 ENCOUNTER — MYC MEDICAL ADVICE (OUTPATIENT)
Dept: FAMILY MEDICINE | Facility: CLINIC | Age: 65
End: 2025-01-10

## 2025-01-12 ENCOUNTER — MYC MEDICAL ADVICE (OUTPATIENT)
Dept: FAMILY MEDICINE | Facility: CLINIC | Age: 65
End: 2025-01-12

## 2025-01-12 DIAGNOSIS — Z80.42 FH: MALIGNANT NEOPLASM OF PROSTATE: Primary | ICD-10-CM

## 2025-01-12 PROCEDURE — 84154 ASSAY OF PSA FREE: CPT | Mod: 90 | Performed by: PHYSICIAN ASSISTANT

## 2025-01-12 PROCEDURE — 36415 COLL VENOUS BLD VENIPUNCTURE: CPT | Performed by: PHYSICIAN ASSISTANT

## 2025-01-12 PROCEDURE — 84153 ASSAY OF PSA TOTAL: CPT | Mod: 90 | Performed by: PHYSICIAN ASSISTANT

## 2025-01-15 LAB
PSA FREE MFR SERPL: 18 % (CALC)
PSA FREE SERPL-MCNC: 0.6 NG/ML
PSA SERPL-MCNC: 3.3 NG/ML

## 2025-03-29 ENCOUNTER — HEALTH MAINTENANCE LETTER (OUTPATIENT)
Age: 65
End: 2025-03-29

## 2025-06-04 ENCOUNTER — TRANSFERRED RECORDS (OUTPATIENT)
Dept: FAMILY MEDICINE | Facility: CLINIC | Age: 65
End: 2025-06-04

## 2025-06-18 ENCOUNTER — OFFICE VISIT (OUTPATIENT)
Dept: FAMILY MEDICINE | Facility: CLINIC | Age: 65
End: 2025-06-18

## 2025-06-18 VITALS
BODY MASS INDEX: 37.8 KG/M2 | SYSTOLIC BLOOD PRESSURE: 124 MMHG | OXYGEN SATURATION: 96 % | TEMPERATURE: 98.3 F | DIASTOLIC BLOOD PRESSURE: 72 MMHG | HEART RATE: 69 BPM | HEIGHT: 70 IN | WEIGHT: 264 LBS

## 2025-06-18 DIAGNOSIS — E66.01 MORBID OBESITY (H): ICD-10-CM

## 2025-06-18 DIAGNOSIS — G47.33 OSA (OBSTRUCTIVE SLEEP APNEA): ICD-10-CM

## 2025-06-18 DIAGNOSIS — Z79.4 TYPE 2 DIABETES MELLITUS WITHOUT COMPLICATION, WITH LONG-TERM CURRENT USE OF INSULIN (H): ICD-10-CM

## 2025-06-18 DIAGNOSIS — I10 ESSENTIAL HYPERTENSION: ICD-10-CM

## 2025-06-18 DIAGNOSIS — Z01.818 PREOPERATIVE EXAMINATION: Primary | ICD-10-CM

## 2025-06-18 DIAGNOSIS — M25.562 LEFT KNEE PAIN, UNSPECIFIED CHRONICITY: ICD-10-CM

## 2025-06-18 DIAGNOSIS — E78.5 HYPERLIPIDEMIA LDL GOAL <100: ICD-10-CM

## 2025-06-18 DIAGNOSIS — E11.9 TYPE 2 DIABETES MELLITUS WITHOUT COMPLICATION, WITH LONG-TERM CURRENT USE OF INSULIN (H): ICD-10-CM

## 2025-06-18 LAB
% GRANULOCYTES: 58.7 %
BUN SERPL-MCNC: 22 MG/DL (ref 7–25)
BUN/CREATININE RATIO: 25 (ref 6–32)
CALCIUM SERPL-MCNC: 9.6 MG/DL (ref 8.6–10.3)
CHLORIDE SERPLBLD-SCNC: 98.5 MMOL/L (ref 98–110)
CO2 SERPL-SCNC: 29.4 MMOL/L (ref 20–32)
CREAT SERPL-MCNC: 0.88 MG/DL (ref 0.6–1.3)
GLUCOSE SERPL-MCNC: 98 MG/DL (ref 60–99)
HCT VFR BLD AUTO: 48 % (ref 40–53)
HEMOGLOBIN A1C: 5.7 % (ref 4–5.6)
HEMOGLOBIN: 15.6 G/DL (ref 13.3–17.7)
LYMPHOCYTES NFR BLD AUTO: 33.2 %
MCH RBC QN AUTO: 30.8 PG (ref 26–33)
MCHC RBC AUTO-ENTMCNC: 32.5 G/DL (ref 31–36)
MCV RBC AUTO: 94.7 FL (ref 78–100)
MONOCYTES NFR BLD AUTO: 8.1 %
PLATELET COUNT - QUEST: 208 10^9/L (ref 150–375)
POTASSIUM SERPL-SCNC: 4.77 MMOL/L (ref 3.5–5.3)
RBC # BLD AUTO: 5.07 10*12/L (ref 4.4–5.9)
SODIUM SERPL-SCNC: 136.5 MMOL/L (ref 135–146)
WBC # BLD AUTO: 7.7 10*9/L (ref 4–11)

## 2025-06-18 PROCEDURE — 80048 BASIC METABOLIC PNL TOTAL CA: CPT | Performed by: FAMILY MEDICINE

## 2025-06-18 PROCEDURE — 85025 COMPLETE CBC W/AUTO DIFF WBC: CPT | Performed by: FAMILY MEDICINE

## 2025-06-18 PROCEDURE — 36415 COLL VENOUS BLD VENIPUNCTURE: CPT | Performed by: FAMILY MEDICINE

## 2025-06-18 PROCEDURE — 83036 HEMOGLOBIN GLYCOSYLATED A1C: CPT | Performed by: FAMILY MEDICINE

## 2025-06-18 PROCEDURE — 93000 ELECTROCARDIOGRAM COMPLETE: CPT | Performed by: FAMILY MEDICINE

## 2025-06-18 PROCEDURE — 99214 OFFICE O/P EST MOD 30 MIN: CPT | Performed by: FAMILY MEDICINE

## 2025-06-18 NOTE — PROGRESS NOTES
Preoperative Evaluation  [facility]  [address] [place] [address]  [address]  [facility] [address]  Phone: [phone]  Fax: [phone]  Primary Provider: Rich Dumont MD  Pre-op Performing Provider: Rich Dumont MD  Jun 18, 2025 6/18/2025   Surgical Information   What procedure is being done? Left knee arthroscopy   Facility or Hospital where procedure/surgery will be performed: [facility] [place]   Who is doing the procedure / surgery? Dr. Echeverria   Date of surgery / procedure: 06/27/25   Time of surgery / procedure: AM   Where do you plan to recover after surgery? at home with family     Fax number for surgical facility: [phone]    Assessment & Plan     The proposed surgical procedure is considered INTERMEDIATE risk.    Preoperative examination      Left knee pain, unspecified chronicity      Type 2 diabetes mellitus without complication, with long-term current use of insulin (H)  Well controlled, will stop Ozempic over a week prior to surgery    Hyperlipidemia LDL goal <100  controlled    Essential hypertension  Well controlled, continue current medications at current doses     ILEANA (obstructive sleep apnea)  cpap    Morbid obesity (H)               - No identified additional risk factors other than previously addressed    Antiplatelet or Anticoagulation Medication Instructions   - We reviewed the medication list and the patient is not on an antiplatelet or anticoagulation medications.    Additional Medication Instructions  Take all scheduled medications on the day of surgery EXCEPT for modifications listed below:   - naproxen (Aleve, Naprosyn): DO NOT TAKE 4 days before surgery.    - GLP-1 Injectable (exenitide, liraglutide, semaglutide, dulaglutide, etc.): DO NOT TAKE 7 days before surgery     Recommendation  Approval given to proceed with proposed procedure, without further diagnostic evaluation.        Ashley Rodriguez is a 65 year old, presenting for the  following:  Pre-Op Exam        HPI: knee meniscal tear          6/18/2025   Pre-Op Questionnaire   Have you ever had a heart attack or stroke? No   Have you ever had surgery on your heart or blood vessels, such as a stent placement, a coronary artery bypass, or surgery on an artery in your head, neck, heart, or legs? No   Do you have chest pain with activity? No   Do you have a history of heart failure? No   Do you currently have a cold, bronchitis or symptoms of other infection? No   Do you have a cough, shortness of breath, or wheezing? No   Do you or anyone in your family have previous history of blood clots? No   Do you or does anyone in your family have a serious bleeding problem such as prolonged bleeding following surgeries or cuts? No   Have you ever had problems with anemia or been told to take iron pills? No   Have you had any abnormal blood loss such as black, tarry or bloody stools? No   Have you ever had a blood transfusion? No   Are you willing to have a blood transfusion if it is medically needed before, during, or after your surgery? Yes   Have you or any of your relatives ever had problems with anesthesia? No   Do you have sleep apnea, excessive snoring or daytime drowsiness? (!) YES   Do you have a CPAP machine? Yes   Do you have any artifical heart valves or other implanted medical devices like a pacemaker, defibrillator, or continuous glucose monitor? No   Do you have artificial joints? (!) YES   Are you allergic to latex? No     Advance Care Planning    Patient states has Health Care Directive and will send to [employer].    Preoperative Review of             Patient Active Problem List    Diagnosis Date Noted    Lumbar spinal stenosis 08/01/2024     Priority: [facility] Orthopedic MRI shows 7 discs with herniation and some with stenosis.  Treated with prednisone and physical therapy in November/December 2024      Type 2 diabetes mellitus without complication, without  long-term current use of insulin (H) 07/05/2023     Priority: Medium    Morbid obesity (H) 12/29/2020     Priority: Medium    Status post THR (total hip replacement) 12/21/2011     Priority: Medium    Osteoarthritis 12/21/2011     Priority: Medium    Essential hypertension, benign 12/07/2010     Priority: Medium    Family history of prostate cancer 12/07/2010     Priority: Medium      Past Medical History:   Diagnosis Date    Chronic pain     back pain    Hypertension     PONV (postoperative nausea and vomiting)      Past Surgical History:   Procedure Laterality Date    ARTHROPLASTY HIP      left    ARTHROPLASTY HIP  12/21/2011    Procedure:ARTHROPLASTY HIP; Right Total Hip Arthroplasty, Right Hip Hardware Removal     ; Surgeon:ISAAC RODRIGUEZ; Location:[facility] OR    ORTHOPEDIC SURGERY      right hip pinning     Current Outpatient Medications   Medication Sig Dispense Refill    amLODIPine (NORVASC) 10 MG tablet Take 1 tablet (10 mg) by mouth daily. 90 tablet 1    lisinopril-hydrochlorothiazide (ZESTORETIC) 20-25 MG tablet Take 1 tablet by mouth daily. TK 1 T PO D 90 tablet 1    naproxen sodium (ANAPROX) 220 MG tablet Take 220 mg by mouth 2 times daily (with meals).      Semaglutide, 2 MG/DOSE, (OZEMPIC) 8 MG/3ML pen Inject 2 mg subcutaneously every 7 days. 9 mL 1    sildenafil (VIAGRA) 100 MG tablet Take 1 tablet (100 mg) by mouth daily as needed 30 tablet 1       No Known Allergies     Social History     Tobacco Use    Smoking status: Never     Passive exposure: Never    Smokeless tobacco: Never   Substance Use Topics    Alcohol use: Yes     Alcohol/week: 4.0 standard drinks of alcohol     Types: 4 Standard drinks or equivalent per week     Family History   Problem Relation Age of Onset    Prostate Cancer Father     Coronary Artery Disease Father     Colon Cancer Other     Cerebrovascular Disease Other     Diabetes No family hx of      History   Drug Use No             Review of Systems  Constitutional, HEENT,  "cardiovascular, pulmonary, gi and gu systems are negative, except as otherwise noted.    Objective    /72 (BP Location: Right arm, Patient Position: Sitting, Cuff Size: Adult Large)   Pulse 69   Temp 98.3  F (36.8  C) (Temporal)   Ht 1.778 m (5' 10\")   Wt 119.7 kg (264 lb)   SpO2 96%   BMI 37.88 kg/m     Estimated body mass index is 37.88 kg/m  as calculated from the following:    Height as of this encounter: 1.778 m (5' 10\").    Weight as of this encounter: 119.7 kg (264 lb).  Physical Exam  GENERAL: alert and no distress  NECK: no adenopathy, no asymmetry, masses, or scars  RESP: lungs clear to auscultation - no rales, rhonchi or wheezes  CV: regular rate and rhythm, normal S1 S2, no S3 or S4, no murmur, click or rub, no peripheral edema  ABDOMEN: soft, nontender, no hepatosplenomegaly, no masses and bowel sounds normal  MS: no gross musculoskeletal defects noted, no edema    Recent Labs   Lab Test 01/09/25  0908 01/09/25  0000 08/06/24  0000   NA  --  141.4 137.4   POTASSIUM  --  3.82 4.37   CR  --  0.86 0.91   A1C 5.7*  --  5.5        Diagnostics  Recent Results (from the past 48 hours)   HEMOGRAM PLATELET DIFF (BFP)    Collection Time: 06/18/25  3:58 PM   Result Value Ref Range    WBC 7.7 4.0 - 11 10*9/L    RBC Count 5.07 4.4 - 5.9 10*12/L    Hemoglobin 15.6 13.3 - 17.7 g/dL    Hematocrit 48.0 40.0 - 53.0 %    MCV 94.7 78 - 100 fL    MCH 30.8 26 - 33 pg    MCHC 32.5 31 - 36 g/dL    Platelet Count 208 150 - 375 10^9/L    % Granulocytes 58.7 %    % Lymphocytes 33.2 %    % Monocytes 8.1 %   HEMOGLOBIN A1C (BFP)    Collection Time: 06/18/25  3:58 PM   Result Value Ref Range    Hemoglobin A1C 5.7 (A) 4 - 5.6 %   Basic Metabolic Panel (BFP)    Collection Time: 06/18/25  4:39 PM   Result Value Ref Range    Carbon Dioxide 29.4 20 - 32 mmol/L    Creatinine 0.88 0.60 - 1.30 mg/dL    Glucose 98 60 - 99 mg/dL    Sodium 136.5 135 - 146 mmol/L    Potassium 4.77 3.5 - 5.3 mmol/L    Chloride 98.5 98 - 110 mmol/L    " Urea Nitrogen 22 7 - 25 mg/dL    Calcium 9.6 8.6 - 10.3 mg/dL    BUN/Creatinine Ratio 25 6 - 32      EKG: appears normal, NSR, normal axis, normal intervals, no acute ST/T changes c/w ischemia, no LVH by voltage criteria    Revised Cardiac Risk Index (RCRI)  The patient has the following serious cardiovascular risks for perioperative complications:   - No serious cardiac risks = 0 points     RCRI Interpretation: 0 points: Class I (very low risk - 0.4% complication rate)         Signed Electronically by: Rich Dumont MD  A copy of this evaluation report is provided to the requesting physician.

## 2025-06-18 NOTE — LETTER
6/18/2025      Mayur HAM Celeste  [address] [facility] [address]        Preoperative Evaluation  [facility]  [address] [place]  [address]  [facility] [address]  Phone: [phone]  Fax: [phone]  Primary Provider: Rich Dumont MD  Pre-op Performing Provider: Rich Dumont MD  Jun 18, 2025 6/18/2025   Surgical Information   What procedure is being done? Left knee arthroscopy   Facility or Hospital where procedure/surgery will be performed: [facility]   Who is doing the procedure / surgery? Dr. Echeverria   Date of surgery / procedure: 06/27/25   Time of surgery / procedure: AM   Where do you plan to recover after surgery? at home with family     Fax number for surgical facility: [phone]    Assessment & Plan    The proposed surgical procedure is considered INTERMEDIATE risk.    Preoperative examination      Left knee pain, unspecified chronicity      Type 2 diabetes mellitus without complication, with long-term current use of insulin (H)  Well controlled, will stop Ozempic over a week prior to surgery    Hyperlipidemia LDL goal <100  controlled    Essential hypertension  Well controlled, continue current medications at current doses     ILEANA (obstructive sleep apnea)  cpap    Morbid obesity (H)               - No identified additional risk factors other than previously addressed    Antiplatelet or Anticoagulation Medication Instructions   - We reviewed the medication list and the patient is not on an antiplatelet or anticoagulation medications.    Additional Medication Instructions  Take all scheduled medications on the day of surgery EXCEPT for modifications listed below:   - naproxen (Aleve, Naprosyn): DO NOT TAKE 4 days before surgery.    - GLP-1 Injectable (exenitide, liraglutide, semaglutide, dulaglutide, etc.): DO NOT TAKE 7 days before surgery     Recommendation  Approval given to proceed with proposed procedure, without further diagnostic  evaluation.        Subjective  Michael is a 65 year old, presenting for the following:  Pre-Op Exam        HPI: knee meniscal tear          6/18/2025   Pre-Op Questionnaire   Have you ever had a heart attack or stroke? No   Have you ever had surgery on your heart or blood vessels, such as a stent placement, a coronary artery bypass, or surgery on an artery in your head, neck, heart, or legs? No   Do you have chest pain with activity? No   Do you have a history of heart failure? No   Do you currently have a cold, bronchitis or symptoms of other infection? No   Do you have a cough, shortness of breath, or wheezing? No   Do you or anyone in your family have previous history of blood clots? No   Do you or does anyone in your family have a serious bleeding problem such as prolonged bleeding following surgeries or cuts? No   Have you ever had problems with anemia or been told to take iron pills? No   Have you had any abnormal blood loss such as black, tarry or bloody stools? No   Have you ever had a blood transfusion? No   Are you willing to have a blood transfusion if it is medically needed before, during, or after your surgery? Yes   Have you or any of your relatives ever had problems with anesthesia? No   Do you have sleep apnea, excessive snoring or daytime drowsiness? (!) YES   Do you have a CPAP machine? Yes   Do you have any artifical heart valves or other implanted medical devices like a pacemaker, defibrillator, or continuous glucose monitor? No   Do you have artificial joints? (!) YES   Are you allergic to latex? No     Advance Care Planning    Patient states has Health Care Directive and will send to [employer].    Preoperative Review of             Patient Active Problem List    Diagnosis Date Noted     Lumbar spinal stenosis 08/01/2024     Priority: [facility] Orthopedic MRI shows 7 discs with herniation and some with stenosis.  Treated with prednisone and physical therapy in  November/December 2024       Type 2 diabetes mellitus without complication, without long-term current use of insulin (H) 07/05/2023     Priority: Medium     Morbid obesity (H) 12/29/2020     Priority: Medium     Status post THR (total hip replacement) 12/21/2011     Priority: Medium     Osteoarthritis 12/21/2011     Priority: Medium     Essential hypertension, benign 12/07/2010     Priority: Medium     Family history of prostate cancer 12/07/2010     Priority: Medium      Past Medical History:   Diagnosis Date     Chronic pain     back pain     Hypertension      PONV (postoperative nausea and vomiting)      Past Surgical History:   Procedure Laterality Date     ARTHROPLASTY HIP      left     ARTHROPLASTY HIP  12/21/2011    Procedure:ARTHROPLASTY HIP; Right Total Hip Arthroplasty, Right Hip Hardware Removal     ; Surgeon:ISAAC RODRIGUEZ; Location:[facility] OR     ORTHOPEDIC SURGERY      right hip pinning     Current Outpatient Medications   Medication Sig Dispense Refill     amLODIPine (NORVASC) 10 MG tablet Take 1 tablet (10 mg) by mouth daily. 90 tablet 1     lisinopril-hydrochlorothiazide (ZESTORETIC) 20-25 MG tablet Take 1 tablet by mouth daily. TK 1 T PO D 90 tablet 1     naproxen sodium (ANAPROX) 220 MG tablet Take 220 mg by mouth 2 times daily (with meals).       Semaglutide, 2 MG/DOSE, (OZEMPIC) 8 MG/3ML pen Inject 2 mg subcutaneously every 7 days. 9 mL 1     sildenafil (VIAGRA) 100 MG tablet Take 1 tablet (100 mg) by mouth daily as needed 30 tablet 1       No Known Allergies     Social History     Tobacco Use     Smoking status: Never     Passive exposure: Never     Smokeless tobacco: Never   Substance Use Topics     Alcohol use: Yes     Alcohol/week: 4.0 standard drinks of alcohol     Types: 4 Standard drinks or equivalent per week     Family History   Problem Relation Age of Onset     Prostate Cancer Father      Coronary Artery Disease Father      Colon Cancer Other      Cerebrovascular Disease Other       "Diabetes No family hx of      History   Drug Use No             Review of Systems  Constitutional, HEENT, cardiovascular, pulmonary, gi and gu systems are negative, except as otherwise noted.    Objective   /72 (BP Location: Right arm, Patient Position: Sitting, Cuff Size: Adult Large)   Pulse 69   Temp 98.3  F (36.8  C) (Temporal)   Ht 1.778 m (5' 10\")   Wt 119.7 kg (264 lb)   SpO2 96%   BMI 37.88 kg/m     Estimated body mass index is 37.88 kg/m  as calculated from the following:    Height as of this encounter: 1.778 m (5' 10\").    Weight as of this encounter: 119.7 kg (264 lb).  Physical Exam  GENERAL: alert and no distress  NECK: no adenopathy, no asymmetry, masses, or scars  RESP: lungs clear to auscultation - no rales, rhonchi or wheezes  CV: regular rate and rhythm, normal S1 S2, no S3 or S4, no murmur, click or rub, no peripheral edema  ABDOMEN: soft, nontender, no hepatosplenomegaly, no masses and bowel sounds normal  MS: no gross musculoskeletal defects noted, no edema    Recent Labs   Lab Test 01/09/25  0908 01/09/25  0000 08/06/24  0000   NA  --  141.4 137.4   POTASSIUM  --  3.82 4.37   CR  --  0.86 0.91   A1C 5.7*  --  5.5        Diagnostics  Recent Results (from the past 48 hours)   HEMOGRAM PLATELET DIFF (BFP)    Collection Time: 06/18/25  3:58 PM   Result Value Ref Range    WBC 7.7 4.0 - 11 10*9/L    RBC Count 5.07 4.4 - 5.9 10*12/L    Hemoglobin 15.6 13.3 - 17.7 g/dL    Hematocrit 48.0 40.0 - 53.0 %    MCV 94.7 78 - 100 fL    MCH 30.8 26 - 33 pg    MCHC 32.5 31 - 36 g/dL    Platelet Count 208 150 - 375 10^9/L    % Granulocytes 58.7 %    % Lymphocytes 33.2 %    % Monocytes 8.1 %   HEMOGLOBIN A1C (BFP)    Collection Time: 06/18/25  3:58 PM   Result Value Ref Range    Hemoglobin A1C 5.7 (A) 4 - 5.6 %   Basic Metabolic Panel (BFP)    Collection Time: 06/18/25  4:39 PM   Result Value Ref Range    Carbon Dioxide 29.4 20 - 32 mmol/L    Creatinine 0.88 0.60 - 1.30 mg/dL    Glucose 98 60 - 99 " mg/dL    Sodium 136.5 135 - 146 mmol/L    Potassium 4.77 3.5 - 5.3 mmol/L    Chloride 98.5 98 - 110 mmol/L    Urea Nitrogen 22 7 - 25 mg/dL    Calcium 9.6 8.6 - 10.3 mg/dL    BUN/Creatinine Ratio 25 6 - 32      EKG: appears normal, NSR, normal axis, normal intervals, no acute ST/T changes c/w ischemia, no LVH by voltage criteria    Revised Cardiac Risk Index (RCRI)  The patient has the following serious cardiovascular risks for perioperative complications:   - No serious cardiac risks = 0 points     RCRI Interpretation: 0 points: Class I (very low risk - 0.4% complication rate)         Signed Electronically by: Rich Dumont MD  A copy of this evaluation report is provided to the requesting physician.             Sincerely,        Rich Dumont MD    Electronically signed

## 2025-07-07 ENCOUNTER — APPOINTMENT (OUTPATIENT)
Dept: ULTRASOUND IMAGING | Facility: CLINIC | Age: 65
End: 2025-07-07
Attending: EMERGENCY MEDICINE
Payer: COMMERCIAL

## 2025-07-07 ENCOUNTER — APPOINTMENT (OUTPATIENT)
Dept: CT IMAGING | Facility: CLINIC | Age: 65
End: 2025-07-07
Attending: EMERGENCY MEDICINE
Payer: COMMERCIAL

## 2025-07-07 ENCOUNTER — HOSPITAL ENCOUNTER (EMERGENCY)
Facility: CLINIC | Age: 65
Discharge: HOME | End: 2025-07-07
Attending: EMERGENCY MEDICINE | Admitting: EMERGENCY MEDICINE
Payer: COMMERCIAL

## 2025-07-07 VITALS
RESPIRATION RATE: 18 BRPM | WEIGHT: 268.08 LBS | HEART RATE: 66 BPM | DIASTOLIC BLOOD PRESSURE: 79 MMHG | TEMPERATURE: 98 F | HEIGHT: 70 IN | SYSTOLIC BLOOD PRESSURE: 127 MMHG | BODY MASS INDEX: 38.38 KG/M2 | OXYGEN SATURATION: 100 %

## 2025-07-07 DIAGNOSIS — I26.99 BILATERAL PULMONARY EMBOLISM (H): ICD-10-CM

## 2025-07-07 DIAGNOSIS — I82.432 ACUTE DEEP VEIN THROMBOSIS (DVT) OF POPLITEAL VEIN OF LEFT LOWER EXTREMITY (H): ICD-10-CM

## 2025-07-07 LAB
ALBUMIN SERPL BCG-MCNC: 4.1 G/DL (ref 3.5–5.2)
ALP SERPL-CCNC: 52 U/L (ref 40–150)
ALT SERPL W P-5'-P-CCNC: 23 U/L (ref 0–70)
ANION GAP SERPL CALCULATED.3IONS-SCNC: 12 MMOL/L (ref 7–15)
AST SERPL W P-5'-P-CCNC: 22 U/L (ref 0–45)
ATRIAL RATE - MUSE: 69 BPM
BASOPHILS # BLD AUTO: 0.1 10E3/UL (ref 0–0.2)
BASOPHILS NFR BLD AUTO: 1 %
BILIRUB SERPL-MCNC: 0.5 MG/DL
BUN SERPL-MCNC: 22.1 MG/DL (ref 8–23)
CALCIUM SERPL-MCNC: 9.3 MG/DL (ref 8.8–10.4)
CHLORIDE SERPL-SCNC: 100 MMOL/L (ref 98–107)
CREAT SERPL-MCNC: 0.75 MG/DL (ref 0.67–1.17)
DIASTOLIC BLOOD PRESSURE - MUSE: NORMAL MMHG
EGFRCR SERPLBLD CKD-EPI 2021: >90 ML/MIN/1.73M2
EOSINOPHIL # BLD AUTO: 0.3 10E3/UL (ref 0–0.7)
EOSINOPHIL NFR BLD AUTO: 2 %
ERYTHROCYTE [DISTWIDTH] IN BLOOD BY AUTOMATED COUNT: 12.4 % (ref 10–15)
GLUCOSE SERPL-MCNC: 122 MG/DL (ref 70–99)
HCO3 SERPL-SCNC: 27 MMOL/L (ref 22–29)
HCT VFR BLD AUTO: 41.8 % (ref 40–53)
HGB BLD-MCNC: 14.5 G/DL (ref 13.3–17.7)
IMM GRANULOCYTES # BLD: 0 10E3/UL
IMM GRANULOCYTES NFR BLD: 0 %
INTERPRETATION ECG - MUSE: NORMAL
LYMPHOCYTES # BLD AUTO: 1.7 10E3/UL (ref 0.8–5.3)
LYMPHOCYTES NFR BLD AUTO: 15 %
MCH RBC QN AUTO: 30.3 PG (ref 26.5–33)
MCHC RBC AUTO-ENTMCNC: 34.7 G/DL (ref 31.5–36.5)
MCV RBC AUTO: 87 FL (ref 78–100)
MONOCYTES # BLD AUTO: 1 10E3/UL (ref 0–1.3)
MONOCYTES NFR BLD AUTO: 9 %
NEUTROPHILS # BLD AUTO: 7.8 10E3/UL (ref 1.6–8.3)
NEUTROPHILS NFR BLD AUTO: 72 %
NRBC # BLD AUTO: 0 10E3/UL
NRBC BLD AUTO-RTO: 0 /100
P AXIS - MUSE: 54 DEGREES
PLATELET # BLD AUTO: 203 10E3/UL (ref 150–450)
POTASSIUM SERPL-SCNC: 4.3 MMOL/L (ref 3.4–5.3)
PR INTERVAL - MUSE: 280 MS
PROT SERPL-MCNC: 7.3 G/DL (ref 6.4–8.3)
QRS DURATION - MUSE: 156 MS
QT - MUSE: 438 MS
QTC - MUSE: 469 MS
R AXIS - MUSE: -48 DEGREES
RBC # BLD AUTO: 4.78 10E6/UL (ref 4.4–5.9)
SODIUM SERPL-SCNC: 139 MMOL/L (ref 135–145)
SYSTOLIC BLOOD PRESSURE - MUSE: NORMAL MMHG
T AXIS - MUSE: 25 DEGREES
TROPONIN T SERPL HS-MCNC: 11 NG/L
VENTRICULAR RATE- MUSE: 69 BPM
WBC # BLD AUTO: 10.8 10E3/UL (ref 4–11)

## 2025-07-07 PROCEDURE — 93005 ELECTROCARDIOGRAM TRACING: CPT | Performed by: EMERGENCY MEDICINE

## 2025-07-07 PROCEDURE — 84484 ASSAY OF TROPONIN QUANT: CPT | Performed by: EMERGENCY MEDICINE

## 2025-07-07 PROCEDURE — 71275 CT ANGIOGRAPHY CHEST: CPT

## 2025-07-07 PROCEDURE — 36415 COLL VENOUS BLD VENIPUNCTURE: CPT | Performed by: EMERGENCY MEDICINE

## 2025-07-07 PROCEDURE — 85025 COMPLETE CBC W/AUTO DIFF WBC: CPT | Performed by: EMERGENCY MEDICINE

## 2025-07-07 PROCEDURE — 93971 EXTREMITY STUDY: CPT | Mod: LT

## 2025-07-07 PROCEDURE — 250N000013 HC RX MED GY IP 250 OP 250 PS 637: Performed by: EMERGENCY MEDICINE

## 2025-07-07 PROCEDURE — 99285 EMERGENCY DEPT VISIT HI MDM: CPT | Mod: 25 | Performed by: EMERGENCY MEDICINE

## 2025-07-07 PROCEDURE — 82040 ASSAY OF SERUM ALBUMIN: CPT | Performed by: EMERGENCY MEDICINE

## 2025-07-07 PROCEDURE — 250N000011 HC RX IP 250 OP 636: Performed by: EMERGENCY MEDICINE

## 2025-07-07 PROCEDURE — 250N000009 HC RX 250: Performed by: EMERGENCY MEDICINE

## 2025-07-07 RX ORDER — OXYCODONE HYDROCHLORIDE 5 MG/1
5 TABLET ORAL EVERY 6 HOURS PRN
Qty: 12 TABLET | Refills: 0 | Status: SHIPPED | OUTPATIENT
Start: 2025-07-07

## 2025-07-07 RX ORDER — IOPAMIDOL 755 MG/ML
500 INJECTION, SOLUTION INTRAVASCULAR ONCE
Status: COMPLETED | OUTPATIENT
Start: 2025-07-07 | End: 2025-07-07

## 2025-07-07 RX ADMIN — IOPAMIDOL 88 ML: 755 INJECTION, SOLUTION INTRAVENOUS at 07:52

## 2025-07-07 RX ADMIN — SODIUM CHLORIDE 100 ML: 9 INJECTION, SOLUTION INTRAVENOUS at 07:52

## 2025-07-07 RX ADMIN — RIVAROXABAN 15 MG: 15 TABLET, FILM COATED ORAL at 09:06

## 2025-07-07 ASSESSMENT — ACTIVITIES OF DAILY LIVING (ADL)
ADLS_ACUITY_SCORE: 41

## 2025-07-07 ASSESSMENT — COLUMBIA-SUICIDE SEVERITY RATING SCALE - C-SSRS
1. IN THE PAST MONTH, HAVE YOU WISHED YOU WERE DEAD OR WISHED YOU COULD GO TO SLEEP AND NOT WAKE UP?: NO
2. HAVE YOU ACTUALLY HAD ANY THOUGHTS OF KILLING YOURSELF IN THE PAST MONTH?: NO
6. HAVE YOU EVER DONE ANYTHING, STARTED TO DO ANYTHING, OR PREPARED TO DO ANYTHING TO END YOUR LIFE?: NO

## 2025-07-07 NOTE — DISCHARGE INSTRUCTIONS
Testing today showed that your pain is due to blood clots in both lungs and your left leg.  This is called a pulmonary embolism.  Thankfully it is not expected to cause significant long-term problems.  We do need to treated with blood thinners to prevent future blood clots.  Likely this treatment will need to be for at least 3 months, potentially longer.  Follow-up with your doctor as soon as possible to discuss next steps and to secure refills.      I sent prescription to your pharmacy for the first month of Xarelto which is a blood thinner.  You can no longer take NSAIDs such as Advil, ibuprofen, Motrin, naproxen, Aleve, aspirin.  It is safe to take Tylenol.  The maximum dose is 1000 mg every 6 hours as needed for pain.  Take oxycodone for breakthrough pain.  This is a narcotic, therefore you should not be driving or operating heavy machinery while taking it.    Blood thinners make it easier to bleed, therefore you should be seen in an emergency department if you suffer any trauma, but especially head trauma.  Also return for blood in stool or urine or any bleeding that you cannot get to stop.  Also return to emergency department for worsening pain, shortness of breath, if you pass out, or for any other concerns.

## 2025-07-07 NOTE — ED PROVIDER NOTES
"  Emergency Department Note      History of Present Illness     Chief Complaint   Post-op Problem      HPI   Mayur Celeste is a 65 year old male with a history of chronic back pain, hypertension, and type 2 diabetes who presents for evaluation of a post-op problem. Patient reports that he had a meniscus cleanup in his left knee on 6/27/25. He developed a lump on the inner side of his left thigh soon afterwards, which has been increasing in size and getting warmer with time. He was seen at [facility] urgent care three days ago, but was unable to get an ultrasound there and was told to come in to the ED if his symptoms changed or worsened. Over the last day, he started to experience cramping and a sharp pain in his right upper back and shoulder blade which is worse with deep breaths. He denies shortness of breath, passing out, or dizziness. He also had some tightness in his chest a couple day ago but did not think much of it because it felt like bronchitis.     Independent Historian   None    Review of External Notes   None    Past Medical History     Medical History and Problem List   Chronic back pain   Hypertension   Obesity   Osteoarthritis   Type 2 diabetes   Lumbar spinal stenosis     Medications   Norvasc   Zestoretic   Ozempic   Viagra   Anaprox     Surgical History   Arthroplasty hip, right   Knee surgery   ORIF hip fracture     Physical Exam     Patient Vitals for the past 24 hrs:   BP Temp Temp src Pulse Resp SpO2 Height Weight   07/07/25 0914 127/79 -- -- 66 18 100 % -- --   07/07/25 0538 132/75 98  F (36.7  C) Temporal 79 20 96 % 1.778 m (5' 10\") 121.6 kg (268 lb 1.3 oz)     Physical Exam  Nursing note and vitals reviewed.  HENT:   Mouth/Throat: Moist mucous membranes.   Eyes: EOMI, nonicteric sclera  Cardiovascular: Normal rate, regular rhythm, no murmur  Pulmonary/Chest: Effort normal and breath sounds normal. No respiratory distress. No wheezes. No rales.   Abdominal: Soft. Nontender, nondistended, no " guarding or rigidity.   Musculoskeletal: Normal range of motion.   Neurological: Alert. Moves all extremities spontaneously.   Skin: Skin is warm and dry. No rash noted.  Superficial thrombophlebitis noted to left medial thigh.        Diagnostics     Lab Results   Labs Ordered and Resulted from Time of ED Arrival to Time of ED Departure   COMPREHENSIVE METABOLIC PANEL - Abnormal       Result Value    Sodium 139      Potassium 4.3      Carbon Dioxide (CO2) 27      Anion Gap 12      Urea Nitrogen 22.1      Creatinine 0.75      GFR Estimate >90      Calcium 9.3      Chloride 100      Glucose 122 (*)     Alkaline Phosphatase 52      AST 22      ALT 23      Protein Total 7.3      Albumin 4.1      Bilirubin Total 0.5     TROPONIN T, HIGH SENSITIVITY - Normal    Troponin T, High Sensitivity 11     CBC WITH PLATELETS AND DIFFERENTIAL    WBC Count 10.8      RBC Count 4.78      Hemoglobin 14.5      Hematocrit 41.8      MCV 87      MCH 30.3      MCHC 34.7      RDW 12.4      Platelet Count 203      % Neutrophils 72      % Lymphocytes 15      % Monocytes 9      % Eosinophils 2      % Basophils 1      % Immature Granulocytes 0      NRBCs per 100 WBC 0      Absolute Neutrophils 7.8      Absolute Lymphocytes 1.7      Absolute Monocytes 1.0      Absolute Eosinophils 0.3      Absolute Basophils 0.1      Absolute Immature Granulocytes 0.0      Absolute NRBCs 0.0         Imaging   CT Chest Pulmonary Embolism w Contrast   Final Result   IMPRESSION:   1.  Bilateral interlobar, segmental and subsegmental pulmonary emboli extending into the distal right main pulmonary artery. No significant CT evidence for heart strain.   2.  Right basilar atelectasis versus early pulmonary infarct.   3.  Diminutive right upper lobe 3 mm nodule. Consider follow-up per Fleischner criteria.      Findings were discussed with Dr. Stanford on 7/7/2025 at 8:21 AM.      REFERENCE:   Guidelines for Management of Incidental Pulmonary Nodules Detected on CT Images:  From the Fleischner Society 2017.    Guidelines apply to incidental nodules in patients who are 35 years or older.   Guidelines do not apply to lung cancer screening, patients with immunosuppression, or patients with known primary cancer.      SINGLE NODULE   Nodule size <6 mm   Low-risk patients: No follow-up needed.   High-risk patients: Optional follow-up CT at 12 months.                  US Lower Extremity Venous Duplex Left   Final Result   IMPRESSION:   1.  Partially occlusive DVT in the popliteal vein.   2.  Superficial thrombus in the greater saphenous vein.          EKG   ECG results from 07/07/25   EKG 12 lead     Value    Systolic Blood Pressure     Diastolic Blood Pressure     Ventricular Rate 69    Atrial Rate 69    UT Interval 280    QRS Duration 156        QTc 469    P Axis 54    R AXIS -48    T Axis 25    Interpretation ECG      Sinus rhythm with 1st degree A-V block  Right bundle branch block  Left anterior fascicular block  ** Bifascicular block **  Abnormal ECG  No previous ECGs available           Independent Interpretation   I independently reviewed the chest CT. bilateral PE noted.      ED Course      Medications Administered   Medications   sodium chloride 0.9 % bag for CT scan flush (100 mLs Intravenous $Given 7/7/25 0752)   iopamidol (ISOVUE-370) solution 500 mL (88 mLs Intravenous $Given 7/7/25 0752)   rivaroxaban ANTICOAGULANT (XARELTO) tablet 15 mg (15 mg Oral $Given 7/7/25 0906)       Procedures   Procedures     Discussion of Management   Interventional radiology, Dr. Hart,     ED Course   ED Course as of 07/07/25 1812 Mon Jul 07, 2025   0720 I obtained history and examined the patient as noted above.    0842 I rechecked and updated the patient.         Additional Documentation  None    Medical Decision Making / Diagnosis     CMS Diagnoses: None    MIPS   None               [facility]   Mayur Celeste is a 65 year old male who presents with left lower extremity redness/swelling.   Also has developed some pleuritic chest pain.  With recent surgery, PE/DVT considered most likely etiologies.  Also considered superficial thrombophlebitis, postop pneumonia, pleural effusion, among other etiologies.  Workup here does confirm both PE and DVT.  Fortunately, patient is minimally symptomatic.  No evidence of right heart strain by imaging, EKG, or laboratory workup.  Case discussed with IR who reviewed imaging and also does not believe any intervention other than anticoagulation is indicated.  He is therefore considered low risk and appropriate for outpatient management.  We discussed initiation of blood thinners including need for close follow-up.  We also discussed side effects of blood thinners including need for neuroimaging were he to suffer any head trauma.  First dose Xarelto given here in case problems at pharmacy regarding cost or coverage.  Discussed reasons to return including worsening pain, syncope, dyspnea, fever, or for any other concerns. He is in stable condition at the time of discharge, red flags that should merit ED return were discussed as well as recommended follow-up instructions. All questions were answered and he is in agreement with the plan.      Disposition   The patient was discharged.     Diagnosis     ICD-10-CM    1. Bilateral pulmonary embolism (H)  I26.99       2. Acute deep vein thrombosis (DVT) of popliteal vein of left lower extremity (H)  I82.432            Discharge Medications   Discharge Medication List as of 7/7/2025  9:09 AM        START taking these medications    Details   oxyCODONE (ROXICODONE) 5 MG tablet Take 1 tablet (5 mg) by mouth every 6 hours as needed for moderate to severe pain., Disp-12 tablet, R-0, E-Prescribe      Rivaroxaban ANTICOAGULANT 15 & 20 MG TBPK Starter Therapy Pack Take 15 mg by mouth 2 times daily (with meals) for 21 days, THEN 20 mg daily with food for 9 days. Continue as directed by provider., Disp-51 each, R-0, E-Prescribe                Scribe Disclosure:  I, Anastasiia Hale, am serving as a scribe at 7:27 AM on 7/7/2025 to document services personally performed by Pelon Stanford MD based on my observations and the provider's statements to me.        Pelon Stanford MD  07/07/25 0680

## 2025-07-10 ENCOUNTER — TRANSFERRED RECORDS (OUTPATIENT)
Dept: FAMILY MEDICINE | Facility: CLINIC | Age: 65
End: 2025-07-10

## 2025-07-21 ENCOUNTER — OFFICE VISIT (OUTPATIENT)
Dept: FAMILY MEDICINE | Facility: CLINIC | Age: 65
End: 2025-07-21

## 2025-07-21 VITALS
BODY MASS INDEX: 37.65 KG/M2 | HEIGHT: 70 IN | SYSTOLIC BLOOD PRESSURE: 138 MMHG | WEIGHT: 263 LBS | RESPIRATION RATE: 20 BRPM | TEMPERATURE: 97.8 F | DIASTOLIC BLOOD PRESSURE: 74 MMHG | HEART RATE: 72 BPM

## 2025-07-21 DIAGNOSIS — E11.9 TYPE 2 DIABETES MELLITUS WITHOUT COMPLICATION, WITH LONG-TERM CURRENT USE OF INSULIN (H): ICD-10-CM

## 2025-07-21 DIAGNOSIS — I82.432 ACUTE DEEP VEIN THROMBOSIS (DVT) OF POPLITEAL VEIN OF LEFT LOWER EXTREMITY (H): ICD-10-CM

## 2025-07-21 DIAGNOSIS — Z00.00 ENCOUNTER FOR ANNUAL WELLNESS EXAM IN MEDICARE PATIENT: ICD-10-CM

## 2025-07-21 DIAGNOSIS — I10 ESSENTIAL HYPERTENSION: ICD-10-CM

## 2025-07-21 DIAGNOSIS — I26.99 BILATERAL PULMONARY EMBOLISM (H): Primary | ICD-10-CM

## 2025-07-21 DIAGNOSIS — Z79.4 TYPE 2 DIABETES MELLITUS WITHOUT COMPLICATION, WITH LONG-TERM CURRENT USE OF INSULIN (H): ICD-10-CM

## 2025-07-21 DIAGNOSIS — Z13.89 SCREENING FOR DIABETIC PERIPHERAL NEUROPATHY: ICD-10-CM

## 2025-07-21 LAB
ALBUMIN (URINE) MG/L: 10
ALBUMIN URINE MG/G CR: <30 MG/G CREATININE
CREATININE URINE MG/DL: 10 MG/DL

## 2025-07-21 PROCEDURE — 82570 ASSAY OF URINE CREATININE: CPT | Performed by: FAMILY MEDICINE

## 2025-07-21 PROCEDURE — 99214 OFFICE O/P EST MOD 30 MIN: CPT | Mod: 25 | Performed by: FAMILY MEDICINE

## 2025-07-21 PROCEDURE — G0402 INITIAL PREVENTIVE EXAM: HCPCS | Performed by: FAMILY MEDICINE

## 2025-07-21 PROCEDURE — G2211 COMPLEX E/M VISIT ADD ON: HCPCS | Performed by: FAMILY MEDICINE

## 2025-07-21 PROCEDURE — 82043 UR ALBUMIN QUANTITATIVE: CPT | Performed by: FAMILY MEDICINE

## 2025-07-21 RX ORDER — AMLODIPINE BESYLATE 10 MG/1
10 TABLET ORAL DAILY
Qty: 90 TABLET | Refills: 1 | Status: SHIPPED | OUTPATIENT
Start: 2025-07-21

## 2025-07-21 RX ORDER — LISINOPRIL AND HYDROCHLOROTHIAZIDE 20; 25 MG/1; MG/1
1 TABLET ORAL DAILY
Qty: 90 TABLET | Refills: 1 | Status: SHIPPED | OUTPATIENT
Start: 2025-07-21

## 2025-07-21 NOTE — PROGRESS NOTES
"  {PROVIDER CHARTING PREFERENCE:863510}    Subjective   Michael is a 65 year old, presenting for the following health issues:  ER F/U and Recheck Medication    HPI      {MA/LPN/RN Pre-Provider Visit Orders- hCG/UA/Strep (Optional):727884}  ED/UC Followup:    Facility:  [facility]  Date of visit: 7/7/25  Reason for visit: PE and DVT  Current Status: ***  {additonal problems for provider to add (Optional):775107}    {ROS Picklists (Optional):542533}      Objective    /74 (BP Location: Right arm, Patient Position: Chair, Cuff Size: Adult Large)   Pulse 72   Temp 97.8  F (36.6  C) (Temporal)   Resp 20   Ht 1.778 m (5' 10\")   Wt 119.3 kg (263 lb)   BMI 37.74 kg/m    Body mass index is 37.74 kg/m .  Physical Exam   {Exam List (Optional):123588}    {Diagnostic Test Results (Optional):584394}        Signed Electronically by: Rich Dumont MD  {Email feedback regarding this note to [email or web address]   :[id number]}    "

## 2025-07-21 NOTE — PROGRESS NOTES
SUBJECTIVE:                                                                                Mayur Celeste is a 65 year old male who presents for a Welcome to Medicare Visit.  ER follow up as well- reviewed ER notes from [facility]    All Histories reviewed and updated in EPIC as appropriate.    Visual Acuity:  Right Eye: 20/25   Left Eye: 20/25  Both Eyes: 20/25    Social History     Tobacco Use    Smoking status: Never     Passive exposure: Never    Smokeless tobacco: Never   Substance Use Topics    Alcohol use: Yes     Alcohol/week: 4.0 standard drinks of alcohol     Types: 4 Standard drinks or equivalent per week       Diet: regular, low salt/low fat  Physical Activity: active without specific exercise program    The patient does not drink >3 drinks per day nor >7 drinks per week.      Today's PHQ-2 Score: 0    Do you have a Health Care Directive? Yes, patient states has an Advance Care Planning document and will bring a copy to the clinic.    Current providers sharing in care for this patient include:   Patient Care Team:  Rich Dumont MD as PCP - General (Family Medicine)  Rich Dumont MD as Assigned PCP  Melissa Cotter RPH as Pharmacist (Pharmacist)    Hearing impairment: No hearing concerns  Ability to successfully perform activities of daily living: Yes, no assistance needed  Fall risk:   Fallen 2 or more times in the past year?: No  Home safety:  none identified      The following health maintenance items are reviewed in Epic and correct as of today:  Health Maintenance   Topic Date Due    PNEUMOCOCCAL VACCINE 50+ YEARS (1 of 2 - PCV) Never done    COVID-19 VACCINE (7 - 2024-25 season) 04/04/2025    MEDICARE ANNUAL WELLNESS VISIT  08/06/2025    LIPID  08/06/2025    MICROALBUMIN  08/06/2025    DIABETIC FOOT EXAM  08/06/2025    EYE EXAM  08/26/2025    INFLUENZA VACCINE (1) 09/01/2025    A1C  12/18/2025    BMP  07/07/2026    FALL RISK ASSESSMENT  07/21/2026    DTAP/TDAP/TD VACCINE (3 - Td  or Tdap) 05/01/2028    COLORECTAL CANCER SCREENING  12/20/2029    ADVANCE CARE PLANNING  06/19/2030    HEPATITIS C SCREENING  Completed    PHQ-2 (once per calendar year)  Completed    ZOSTER VACCINE  Completed    RSV VACCINE  Completed    HPV VACCINE  Aged Out    MENINGITIS VACCINE  Aged Out    HIV SCREENING  Discontinued       ROS:        SCREENING FOR PREVENTION and EARLY DETECTION     ECG (if done)not performed    Corrected Visual acuity: L 20/25   R 20/25  Colon CA Screening (>50-75 ) (FITT annually or colonoscopy every 10years):  Recommended and patient accepted testing.  Diabetes Screening : FBG covered if at risk (obesity, HTN, dyslipidemia, FH): Testing not indicated     CV Risk based on Pooled Cohort Risk:The 10-year ASCVD risk score (Michelle YOUNGER, et al., 2019) is: 30.3%    Values used to calculate the score:      Age: 65 years      Sex: Male      Is Non- : No      Diabetic: Yes      Tobacco smoker: No      Systolic Blood Pressure: 138 mmHg      Is BP treated: Yes      HDL Cholesterol: 45 mg/dL      Total Cholesterol: 189 mg/dL    Pooled Cohort Risk Calculator    Advanced Directives: Discussed and patient desires to bring copy.     Immunization History   Administered Date(s) Administered    COVID-19 12+ (Pfizer) 10/04/2024    COVID-19 Bivalent 18+ (Moderna) 10/27/2022    COVID-19 Monovalent 18+ (Moderna) 03/22/2021, 04/19/2021, 10/29/2021, 04/04/2022    Flu, Unspecified 09/20/2017, 12/11/2018, 10/01/2024    H6o5-02 Novel Flu 11/05/2009    INFLUENZA,TRIVALENT (FLUCELVAX) 10/04/2024    Influenza (IIV3) PF 10/05/2011, 10/02/2021    Influenza Vaccine >6 months,quad, PF 09/12/2013, 09/27/2016    Influenza,INJ,MDCK,PF,Quad >6mo(Flucelvax) 12/21/2019, 10/12/2020, 10/27/2022, 09/07/2023    RSV Vaccine (Arexvy) 09/07/2023    TD,PF 7+ (Tenivac) 05/01/2018    TDAP (Adacel,Boostrix) 07/27/2007    Td (Adult), Adsorbed 04/21/2000    Zoster recombinant adjuvanted (Shingrix) 12/21/2019, 05/20/2020  "    Reviewed Immunization Record Today  Pneumoccocal Vaccine: No  Varicella Vaccine: {SMI YES/NO/DECLINES:203120  TDaP: No      OBJECTIVE:                                                                                    Vitals: /74 (BP Location: Right arm, Patient Position: Chair, Cuff Size: Adult Large)   Pulse 72   Temp 97.8  F (36.6  C) (Temporal)   Resp 20   Ht 1.778 m (5' 10\")   Wt 119.3 kg (263 lb)   BMI 37.74 kg/m    BMI= Body mass index is 37.74 kg/m .    EXAM:       ASSESSMENT/PLAN:                                                                  (I26.99) Bilateral pulmonary embolism (H)  (primary encounter diagnosis)  Comment: on Xarelto and tolerating, discussed plan for at least 3 months treatment for provoked DVT-could go 3-6 month depending on pt preference  Plan: continue current medications at current doses     (I82.432) Acute deep vein thrombosis (DVT) of popliteal vein of left lower extremity (H)  Comment: as above, still feels lump  Plan: continue current medications at current doses     (I10) Essential hypertension  Comment: well conrolled  Plan: continue current medications at current doses     (E11.9,  Z79.4) Type 2 diabetes mellitus without complication, with long-term current use of insulin (H)  Comment: well controlled  Plan: continue current medications at current doses     (Z13.89) Screening for diabetic peripheral neuropathy  Comment:   Plan:     (I26.9  (Z00.00) Encounter for annual wellness exam in Medicare patient  Comment: discussed preventitive healthcare Continue to work on healthy diet and exercise, discussed healthy habits   Plan:   Personalized Prevention Plan  You are due for the preventive services outlined below.  Your care team is available to assist you in scheduling these services.  If you have already completed any of these items, please share that information with your care team to update in your medical record.  Health Maintenance   Topic Date Due    " "PNEUMOCOCCAL VACCINE 50+ YEARS (1 of 2 - PCV) Never done    COVID-19 VACCINE (7 - 2024-25 season) 04/04/2025    MEDICARE ANNUAL WELLNESS VISIT  08/06/2025    LIPID  08/06/2025    MICROALBUMIN  08/06/2025    DIABETIC FOOT EXAM  08/06/2025    EYE EXAM  08/26/2025    INFLUENZA VACCINE (1) 09/01/2025    A1C  12/18/2025    BMP  07/07/2026    FALL RISK ASSESSMENT  07/21/2026    DTAP/TDAP/TD VACCINE (3 - Td or Tdap) 05/01/2028    COLORECTAL CANCER SCREENING  12/20/2029    ADVANCE CARE PLANNING  07/21/2030    HEPATITIS C SCREENING  Completed    PHQ-2 (once per calendar year)  Completed    ZOSTER VACCINE  Completed    RSV VACCINE  Completed    HPV VACCINE  Aged Out    MENINGITIS VACCINE  Aged Out    HIV SCREENING  Discontinued         End of Life Planning:   Patient currently has an advanced directive: Yes.  Practitioner is supportive of decision.    COUNSELING:    Estimated body mass index is 37.74 kg/m  as calculated from the following:    Height as of this encounter: 1.778 m (5' 10\").    Weight as of this encounter: 119.3 kg (263 lb).  Weight management plan: Discussed healthy diet and exercise guidelines   reports that he has never smoked. He has never been exposed to tobacco smoke. He has never used smokeless tobacco.      Appropriate preventive services were discussed with this patient, including applicable screening as appropriate for cardiovascular disease, diabetes, osteopenia/osteoporosis, and glaucoma.  As appropriate for age/gender, discussed screening for colorectal cancer, prostate cancer, breast cancer, and cervical cancer. Checklist reviewing preventive services available has been given to the patient.    Reviewed patients plan of care and provided an AVS. The Basic Care Plan (routine screening as documented in Health Maintenance) for Mayur meets the Care Plan requirement. This Care Plan has been established and reviewed with the Patient.    Rich Dumont MD  [facility]    "

## 2025-07-21 NOTE — NURSING NOTE
Mayur Celeste is here for a ER follow up and medication refill.    Questioned patient about current smoking habits.  Pt. has never smoked.  PULSE regular  My Chart: active  CLASSIFICATION OF OVERWEIGHT AND OBESITY BY BMI                        Obesity Class           BMI(kg/m2)  Underweight                                    < 18.5  Normal                                         18.5-24.9  Overweight                                     25.0-29.9  OBESITY                     I                  30.0-34.9                             II                 35.0-39.9  EXTREME OBESITY             III                >40                            Patient's  BMI Body mass index is 37.74 kg/m .  http://hin.nhlbi.nih.gov/menuplanner/menu.cgi  Pre-visit planning  Immunizations - up to date  Colonoscopy - is up to date  Mammogram -   Asthma -   PHQ9 -    BETITO-7 -      The patient has verbalized that it is ok to leave a detailed voice message on the patient's cell phone with results/recommendations from this visit.

## 2025-07-30 ENCOUNTER — DOCUMENTATION ONLY (OUTPATIENT)
Dept: ANTICOAGULATION | Facility: CLINIC | Age: 65
End: 2025-07-30
Payer: COMMERCIAL

## 2025-07-30 NOTE — PROGRESS NOTES
ANTICOAGULATION DIRECT ORAL ANTICOAGULANT MONITORING    SUBJECTIVE     The [facility] is evaluating Mayur Celeste's Rivaroxaban (Xarelto) as part of its Anticoagulation Monitoring Program.    Indication:DVT  Current dose per medication list: Rivaroxaban 20 mg Daily  Recent hospitalizations/ED/Office Visits for bleeding/clotting concerns: Yes: ED on 7/7/25 for provoked DVT   Other bleeding or side effect concerns: No  Additional findings: None    OBJECTIVE     Age: 65 year old    Adherence score:0 as of     Wt Readings from Last 2 Encounters:   07/21/25 119.3 kg (263 lb)   07/07/25 121.6 kg (268 lb 1.3 oz)      Lab Results   Component Value Date    CR 0.75 07/07/2025    CR 0.88 06/18/2025    CR 0.86 01/09/2025     Creatinine Clearance (using actual bodyweight, mL/min): 166    Lab Results   Component Value Date    HGB 14.5 07/07/2025    HGB 15.6 06/18/2025     07/07/2025     06/18/2025     12/24/2011       ASSESSMENT/PLAN     A chart review for Direct Oral Anticoagulant (DOAC) Stewardship has been completed for:     Dosing: Yuval anticoagulation is followed by provider outside of [facility]. Ongoing therapy to be addressed outside of system.    Plan made per [facility] anticoagulation protocol    Montrell Castro RN  Anticoagulation Clinic

## 2025-08-13 ENCOUNTER — MYC MEDICAL ADVICE (OUTPATIENT)
Dept: FAMILY MEDICINE | Facility: CLINIC | Age: 65
End: 2025-08-13

## 2025-08-13 DIAGNOSIS — I82.432 ACUTE DEEP VEIN THROMBOSIS (DVT) OF POPLITEAL VEIN OF LEFT LOWER EXTREMITY (H): ICD-10-CM

## 2025-08-13 DIAGNOSIS — I26.99 BILATERAL PULMONARY EMBOLISM (H): Primary | ICD-10-CM
